# Patient Record
Sex: MALE | Race: WHITE | NOT HISPANIC OR LATINO | URBAN - METROPOLITAN AREA
[De-identification: names, ages, dates, MRNs, and addresses within clinical notes are randomized per-mention and may not be internally consistent; named-entity substitution may affect disease eponyms.]

---

## 2018-04-13 ENCOUNTER — APPOINTMENT (EMERGENCY)
Dept: RADIOLOGY | Facility: HOSPITAL | Age: 33
End: 2018-04-13
Attending: STUDENT IN AN ORGANIZED HEALTH CARE EDUCATION/TRAINING PROGRAM
Payer: COMMERCIAL

## 2018-04-13 ENCOUNTER — HOSPITAL ENCOUNTER (EMERGENCY)
Facility: HOSPITAL | Age: 33
Discharge: HOME | End: 2018-04-14
Attending: STUDENT IN AN ORGANIZED HEALTH CARE EDUCATION/TRAINING PROGRAM
Payer: COMMERCIAL

## 2018-04-13 DIAGNOSIS — E87.6 HYPOKALEMIA: ICD-10-CM

## 2018-04-13 DIAGNOSIS — K42.9 UMBILICAL HERNIA WITHOUT OBSTRUCTION AND WITHOUT GANGRENE: ICD-10-CM

## 2018-04-13 DIAGNOSIS — N28.89 RENAL MASS, LEFT: ICD-10-CM

## 2018-04-13 DIAGNOSIS — R10.9 ABDOMINAL PAIN, UNSPECIFIED ABDOMINAL LOCATION: Primary | ICD-10-CM

## 2018-04-13 LAB
ALBUMIN SERPL-MCNC: 4.8 G/DL (ref 3.4–5)
ALP SERPL-CCNC: 84 IU/L (ref 35–126)
ALT SERPL-CCNC: 21 IU/L (ref 16–63)
ANION GAP SERPL CALC-SCNC: 13 MEQ/L (ref 3–15)
AST SERPL-CCNC: 20 IU/L (ref 15–41)
BASOPHILS # BLD: 0.05 K/UL (ref 0.01–0.1)
BASOPHILS NFR BLD: 0.5 %
BILIRUB SERPL-MCNC: 1.1 MG/DL (ref 0.3–1.2)
BUN SERPL-MCNC: 15 MG/DL (ref 8–20)
CALCIUM SERPL-MCNC: 9.4 MG/DL (ref 8.9–10.3)
CHLORIDE SERPL-SCNC: 100 MMOL/L (ref 98–109)
CO2 SERPL-SCNC: 23 MMOL/L (ref 22–32)
CREAT SERPL-MCNC: 0.9 MG/DL (ref 0.8–1.3)
DIFFERENTIAL METHOD BLD: NORMAL
EOSINOPHIL # BLD: 0.11 K/UL (ref 0.04–0.54)
EOSINOPHIL NFR BLD: 1.1 %
ERYTHROCYTE [DISTWIDTH] IN BLOOD BY AUTOMATED COUNT: 11.9 % (ref 11.6–14.4)
GFR SERPL CREATININE-BSD FRML MDRD: >60 ML/MIN/1.73M*2
GLUCOSE SERPL-MCNC: 91 MG/DL (ref 70–99)
HCT VFR BLDCO AUTO: 45.2 % (ref 40–51)
HGB BLD-MCNC: 16.2 G/DL (ref 13.7–17.5)
IMM GRANULOCYTES # BLD AUTO: 0.02 K/UL (ref 0–0.08)
IMM GRANULOCYTES NFR BLD AUTO: 0.2 %
LIPASE SERPL-CCNC: 18 U/L (ref 20–51)
LYMPHOCYTES # BLD: 2.22 K/UL (ref 1.2–3.5)
LYMPHOCYTES NFR BLD: 22.2 %
MCH RBC QN AUTO: 31 PG (ref 28–33.2)
MCHC RBC AUTO-ENTMCNC: 35.8 G/DL (ref 32.2–36.5)
MCV RBC AUTO: 86.4 FL (ref 83–98)
MONOCYTES # BLD: 0.74 K/UL (ref 0.3–1)
MONOCYTES NFR BLD: 7.4 %
NEUTROPHILS # BLD: 6.87 K/UL (ref 1.7–7)
NEUTS SEG NFR BLD: 68.6 %
NRBC BLD-RTO: 0 %
PDW BLD AUTO: 9.8 FL (ref 9.4–12.4)
PLATELET # BLD AUTO: 274 K/UL (ref 150–350)
POTASSIUM SERPL-SCNC: 3.2 MMOL/L (ref 3.6–5.1)
PROT SERPL-MCNC: 7.6 G/DL (ref 6–8.2)
RBC # BLD AUTO: 5.23 M/UL (ref 4.5–5.8)
SODIUM SERPL-SCNC: 136 MMOL/L (ref 136–144)
WBC # BLD AUTO: 10.01 K/UL (ref 3.8–10.5)

## 2018-04-13 PROCEDURE — 74177 CT ABD & PELVIS W/CONTRAST: CPT

## 2018-04-13 PROCEDURE — 80053 COMPREHEN METABOLIC PANEL: CPT | Performed by: PHYSICIAN ASSISTANT

## 2018-04-13 PROCEDURE — 85025 COMPLETE CBC W/AUTO DIFF WBC: CPT | Performed by: PHYSICIAN ASSISTANT

## 2018-04-13 PROCEDURE — 83690 ASSAY OF LIPASE: CPT | Performed by: STUDENT IN AN ORGANIZED HEALTH CARE EDUCATION/TRAINING PROGRAM

## 2018-04-13 PROCEDURE — 25800000 HC PHARMACY IV SOLUTIONS: Performed by: PHYSICIAN ASSISTANT

## 2018-04-13 PROCEDURE — 36415 COLL VENOUS BLD VENIPUNCTURE: CPT | Performed by: PHYSICIAN ASSISTANT

## 2018-04-13 RX ADMIN — SODIUM CHLORIDE 1000 ML: 9 INJECTION, SOLUTION INTRAVENOUS at 22:49

## 2018-04-13 ASSESSMENT — ENCOUNTER SYMPTOMS
DIZZINESS: 0
CONSTIPATION: 1
NUMBNESS: 0
COUGH: 0
VOMITING: 0
DIFFICULTY URINATING: 0
CHILLS: 0
ABDOMINAL DISTENTION: 0
BACK PAIN: 0
DIARRHEA: 1
EYE PAIN: 0
PALPITATIONS: 0
NECK PAIN: 0
BLOOD IN STOOL: 0
FEVER: 0
WEAKNESS: 0
HEADACHES: 0
NAUSEA: 1
DYSURIA: 0
ABDOMINAL PAIN: 1
COLOR CHANGE: 0

## 2018-04-14 VITALS
SYSTOLIC BLOOD PRESSURE: 132 MMHG | HEIGHT: 71 IN | DIASTOLIC BLOOD PRESSURE: 70 MMHG | BODY MASS INDEX: 34.3 KG/M2 | WEIGHT: 245 LBS | TEMPERATURE: 96.6 F | OXYGEN SATURATION: 100 % | RESPIRATION RATE: 20 BRPM | HEART RATE: 102 BPM

## 2018-04-14 LAB
BILIRUB UR QL STRIP.AUTO: NEGATIVE MG/DL
CLARITY UR REFRACT.AUTO: CLEAR
COLOR UR AUTO: YELLOW
GLUCOSE UR STRIP.AUTO-MCNC: NEGATIVE MG/DL
HGB UR QL STRIP.AUTO: NEGATIVE
KETONES UR STRIP.AUTO-MCNC: 2 MG/DL
LEUKOCYTE ESTERASE UR QL STRIP.AUTO: NEGATIVE
NITRITE UR QL STRIP.AUTO: NEGATIVE
PH UR STRIP.AUTO: 6 [PH]
PROT UR QL STRIP.AUTO: NEGATIVE
SP GR UR REFRACT.AUTO: >1.035
UROBILINOGEN UR STRIP-ACNC: 0.2 EU/DL

## 2018-04-14 PROCEDURE — 63700000 HC SELF-ADMINISTRABLE DRUG: Performed by: STUDENT IN AN ORGANIZED HEALTH CARE EDUCATION/TRAINING PROGRAM

## 2018-04-14 PROCEDURE — 99284 EMERGENCY DEPT VISIT MOD MDM: CPT | Mod: 25

## 2018-04-14 PROCEDURE — 81003 URINALYSIS AUTO W/O SCOPE: CPT | Performed by: PHYSICIAN ASSISTANT

## 2018-04-14 PROCEDURE — 63600105 HC IODINE BASED CONTRAST: Performed by: PHYSICIAN ASSISTANT

## 2018-04-14 RX ORDER — POTASSIUM CHLORIDE 750 MG/1
40 TABLET, FILM COATED, EXTENDED RELEASE ORAL ONCE
Status: COMPLETED | OUTPATIENT
Start: 2018-04-14 | End: 2018-04-14

## 2018-04-14 RX ADMIN — POTASSIUM CHLORIDE 40 MEQ: 750 TABLET, FILM COATED, EXTENDED RELEASE ORAL at 01:53

## 2018-04-14 RX ADMIN — IOHEXOL 100 ML: 300 INJECTION, SOLUTION INTRAVENOUS at 00:35

## 2018-04-14 NOTE — ED PROVIDER NOTES
HPI     Chief Complaint   Patient presents with   • Abdominal Pain   • Nausea       33 y.o M presents to the ED for evaluation of abd pain. Pt states that he had recent travel to the [place] for 1 day and immediately flew back home this morning due to the pain.  Pt states that he has been having constant periumbilical pain x1 week and radiating intermittent LLQ pain. He has 2 umbilical hernias that have not been repaired. 3 days ago the pt had diarrhea, but has not had a BM since. He is unable to tolerate PO solids, but has tolerated PO fluids. Pain is now present in pts LLQ.        Denies dysuria, fever, cough, chills, weakness, numbness, HA, difficulty urinating, CP, vomiting, back/neck pain or any other concerns.     No hx of abdominal surgery    Pt smokes .5 ppd.         History provided by:  Patient   used: No    Abdominal Pain   Pain location:  Periumbilical  Pain quality: aching    Pain radiates to:  LLQ  Pain severity:  Moderate  Onset quality:  Gradual  Duration:  1 week  Timing:  Intermittent  Progression:  Unchanged  Chronicity:  Recurrent  Context: recent travel    Context: not diet changes, not previous surgeries, not recent illness, not sick contacts, not suspicious food intake and not trauma    Relieved by:  Nothing  Worsened by:  Nothing  Ineffective treatments:  None tried  Associated symptoms: constipation, diarrhea and nausea    Associated symptoms: no chest pain, no chills, no cough, no dysuria, no fever and no vomiting    Diarrhea:     Quality:  Unable to specify    Number of occurrences:  3    Severity:  Moderate    Duration:  2 days    Timing:  Intermittent    Progression:  Unable to specify  Nausea:     Severity:  Moderate    Onset quality:  Gradual    Timing:  Intermittent    Progression:  Unable to specify  Risk factors: obesity         Patient History     Past Medical History:   Diagnosis Date   • High cholesterol        History reviewed. No pertinent  "surgical history.    History reviewed. No pertinent family history.    Social History   Substance Use Topics   • Smoking status: Current Every Day Smoker     Packs/day: 0.50   • Smokeless tobacco: Never Used   • Alcohol use Yes      Comment: socially       Systems Reviewed from Nursing Triage:  Problems          Review of Systems     Review of Systems   Constitutional: Negative for chills and fever.   HENT: Negative.  Negative for ear pain.    Eyes: Negative for pain.   Respiratory: Negative for cough.    Cardiovascular: Negative for chest pain and palpitations.   Gastrointestinal: Positive for abdominal pain, constipation, diarrhea and nausea. Negative for abdominal distention, blood in stool and vomiting.   Genitourinary: Negative for difficulty urinating and dysuria.   Musculoskeletal: Negative for back pain and neck pain.   Skin: Negative for color change.   Neurological: Negative for dizziness, weakness, numbness and headaches.        Physical Exam     ED Triage Vitals [04/13/18 2219]   Temp Heart Rate Resp BP SpO2   36.4 °C (97.5 °F) 88 18 131/81 96 %      Temp Source Heart Rate Source Patient Position BP Location FiO2 (%) (Set)   Tympanic -- Sitting Left upper arm --       Pulse Ox %: 96 % (04/13/18 2250)  Pulse Ox Interpretation: Normal (04/13/18 2250)  Heart Rate: 88 (04/13/18 2250)       Patient Vitals for the past 24 hrs:   BP Temp Temp src Pulse Resp SpO2 Height Weight   04/14/18 0043 132/70 (!) 35.9 °C (96.6 °F) Tympanic (!) 102 20 100 % - -   04/13/18 2219 131/81 36.4 °C (97.5 °F) Tympanic 88 18 96 % 1.803 m (5' 11\") 111 kg (245 lb)           Physical Exam   Constitutional: He is oriented to person, place, and time. He appears well-developed and well-nourished. No distress.   HENT:   Head: Normocephalic and atraumatic.   Mouth/Throat: Oropharynx is clear and moist.   Eyes: EOM are normal. Pupils are equal, round, and reactive to light.   Neck: Neck supple. No JVD present.   Cardiovascular: Normal " rate, regular rhythm and normal heart sounds.    No murmur heard.  Pulmonary/Chest: Effort normal and breath sounds normal. No respiratory distress.   Abdominal: Soft. Bowel sounds are normal. He exhibits no distension. There is tenderness in the left lower quadrant. A hernia (2 easily reducible umbilical ) is present.   Musculoskeletal: Normal range of motion. He exhibits no edema or tenderness.   Neurological: He is alert and oriented to person, place, and time. No cranial nerve deficit.   Skin: Skin is warm and dry. No rash noted.   Psychiatric: He has a normal mood and affect.   Nursing note and vitals reviewed.           Procedures    ED Course & MDM     Labs Reviewed   COMPREHENSIVE METABOLIC PANEL - Abnormal        Result Value    Potassium 3.2 (*)     Sodium 136      Chloride 100      CO2 23      BUN 15      Creatinine 0.9      Glucose 91      Calcium 9.4      AST (SGOT) 20      ALT (SGPT) 21      Alkaline Phosphatase 84      Total Protein 7.6      Albumin 4.8      Bilirubin, Total 1.1      eGFR >60.0      Anion Gap 13     UA REFLEX CULTURE (MACROSCOPIC) - Abnormal     Specific Gravity, Urine >1.035 (*)     Ketones, Urine +2 (*)     Color, Urine Yellow      Clarity, Urine Clear      pH, Urine 6.0      Leukocyte Esterase Negative      Nitrite, Urine Negative      Protein, Urine Negative      Glucose, Urine Negative      Urobilinogen, Urine 0.2      Bilirubin, Urine Negative      Blood, Urine Negative     LIPASE - Abnormal     Lipase 18 (*)    CBC - Normal    WBC 10.01      RBC 5.23      Hemoglobin 16.2      Hematocrit 45.2      MCV 86.4      MCH 31.0      MCHC 35.8      RDW 11.9      Platelets 274      MPV 9.8     CBC AND DIFFERENTIAL    Narrative:     The following orders were created for panel order CBC and differential.  Procedure                               Abnormality         Status                     ---------                               -----------         ------                     CBC[[id number]]                            Normal              Final result               Diff Count[[id number]]                                        Final result                 Please view results for these tests on the individual orders.   DIFF COUNT    Differential Type Auto      nRBC 0.0      Immature Granulocytes 0.2      Neutrophils 68.6      Lymphocytes 22.2      Monocytes 7.4      Eosinophils 1.1      Basophils 0.5      Immature Granulocytes, Absolute 0.02      Neutrophils, Absolute 6.87      Lymphocytes, Absolute 2.22      Monocytes, Absolute 0.74      Eosinophils, Absolute 0.11      Basophils, Absolute 0.05     URINALYSIS REFLEX CULTURE    Narrative:     The following orders were created for panel order Urinalysis w/ reflex culture.  Procedure                               Abnormality         Status                     ---------                               -----------         ------                     UA Reflex to Culture (Mac...[[id number]]  Abnormal            Final result                 Please view results for these tests on the individual orders.       CT ABDOMEN PELVIS WITH IV CONTRAST   Final Result   IMPRESSION:   1.  Nonspecific fat stranding within a moderately sized fat containing umbilical   hernia without evidence of drainable fluid collection or abscess. While this   finding is nonspecific, in the right clinical setting this may represent   cellulitis.  I would also correlate with physical examination to ensure that   this hernia is reducible and is not clinically incarcerated.   2.  Colonic diverticulosis without evidence of diverticulitis.   3.  Normal appendix.   4.  Left upper pole 1.2 cm renal hypodensity, which cannot be characterized as a   simple renal cyst. A nonemergent renal ultrasound can be considered for further   evaluation.      I certify that I have personally reviewed this study and agree with this report.   Marbella Rivera MD              MDM  By signing my name below, I, Chris Rivero,  attest that this documentation has been prepared under the direction and in the presence of Sanjiv Denis PA-C.    4/13/2018 10:49 PM      I, PETEY Negron, personally performed the services described in this documentation. All medical record entries made by the scribe were at my direction and in my presence. I have reviewed the chart and discharge instructions (if applicable) and agree that the record reflects my personal performance and is accurate and complete.   4/14/2018 9:17 PM                ED Course as of Apr 14 2117   Sat Apr 14, 2018   0108 Rad resident:   Impression     IMPRESSION:  1.  Nonspecific fat stranding within a moderately sized fat containing umbilical  hernia without evidence of drainable fluid collection or abscess. While this  finding is nonspecific, in the right clinical setting this may represent  cellulitis.  2.  Colonic diverticulosis without evidence of diverticulitis.  3.  Normal appendix.  4.  Left upper pole 1.2 cm renal hypodensity, which cannot be characterized as a  simple renal cyst. A nonemergent renal ultrasound can be considered for further  evaluation.    I certify that I have personally reviewed this study and agree with this report.  Test Doc Test Doc  Lab and Collection     CT ABDOMEN PELVIS WITH IV CONTRAST on 4/13/2018     [NS]   0109 Pt updated on CT and lab findings. Encouraged to give urine sample at this time.   [NS]   0209 UA unremarkable for infection. Likely slight dehydration. Pt stable for dc home.   [NS]      ED Course User Index  [NS] David Whitaker DO         Clinical Impressions as of Apr 14 2117   Abdominal pain, unspecified abdominal location   Hypokalemia   Umbilical hernia without obstruction and without gangrene   Renal mass, left     Disposition:  Discharge     Chris Rivero  04/13/18 2249       PETEY Argueta  04/14/18 2118

## 2018-04-14 NOTE — ED ATTESTATION NOTE
"I saw and evaluated the patient, participated in the management, and agree with the findings in the above note. We discussed the case and the treatment plan.  Hx: pt states left sided abd pain. Intermittent. No exacerbating or alleviating factors. Dose note recent constipation and chronic umbilical hernia.   Exam: mild tachycardia but otherwise vss. Nad, nontoxic appearing. Head at/nc with normal speech. No resp distress. Abd soft without ttp upon my exam. No mass palpated. Umbilica hernia noted and reduced without sign of incarceration. Skin exam normal without warmth, tenderness or erythema (no sign of abdominal wall cellulitis).   Plan: all results reviewed. Abd pain possibly related to constipation. Encouraged miralax and hydration this weekend. Informed of left renal \"hypodensity\" and understands to f/u with pcp for US and continued monitoring. Hernia is not obstructed and stranding noted on CT scan was noted but exam not c/w cellulitis. Questions/concerns addressed. Pt is stable for dc home. Return if worse.      David Whitaker DO  04/14/18 0219    "

## 2018-04-14 NOTE — DISCHARGE INSTRUCTIONS
"Follow up with your primary care physician in 1-3 days for repeat evaluation to ensure improvement and resolution of symptoms.     During your evaluation today a CAT scan was performed. A left upper renal \"hypodensity\" was noted on CAT scan. A non-emergent renal (kidney) ultrasound is recommended for further evaluation. Follow up with your primary care physician for this.     Return to the ER if worse (for example fever, concerning pain, chest pain, shortness of breath) or for any other concern.     "

## 2018-04-26 ENCOUNTER — OFFICE VISIT (OUTPATIENT)
Dept: SURGERY | Facility: CLINIC | Age: 33
End: 2018-04-26
Payer: COMMERCIAL

## 2018-04-26 VITALS — HEIGHT: 71 IN | WEIGHT: 240 LBS | BODY MASS INDEX: 33.6 KG/M2

## 2018-04-26 DIAGNOSIS — K43.9 VENTRAL HERNIA WITHOUT OBSTRUCTION OR GANGRENE: Primary | ICD-10-CM

## 2018-04-26 PROCEDURE — 99204 OFFICE O/P NEW MOD 45 MIN: CPT | Performed by: SURGERY

## 2018-04-26 RX ORDER — SODIUM CHLORIDE 9 MG/ML
INJECTION, SOLUTION INTRAVENOUS CONTINUOUS
Status: CANCELLED | OUTPATIENT
Start: 2018-04-26 | End: 2018-04-27

## 2018-04-26 RX ORDER — ACETAMINOPHEN 325 MG/1
650 TABLET ORAL ONCE
Status: CANCELLED | OUTPATIENT
Start: 2018-04-26 | End: 2018-04-26

## 2018-04-26 RX ORDER — CELECOXIB 100 MG/1
200 CAPSULE ORAL ONCE
Status: CANCELLED | OUTPATIENT
Start: 2018-04-26 | End: 2018-04-26

## 2018-04-26 NOTE — LETTER
2018     Myke Briceño, DO  [address] [facility] [place] [address]    Patient: Alcides Reilly   YOB: 1985   Date of Visit: 2018       Dear Dr. Briceño:    I had the pleasure of seeing Alcides Reilly today for evaluation. Below are my notes for this consultation.    If you have questions, please do not hesitate to call me. I look forward to following your patient along with you.         Sincerely,        Manish Sanabria MD        CC: No Recipients  Manish Sanabria MD  2018  1:53 PM  Sign at close encounter  Patient ID: Alcides Reilly                              : 1985  MRN: [id number]                                            Visit Date: 2018  Encounter Provider: Manish Sanabria    Chief Complaint: Hernia (New patient )    Alcides Reilly is a 33 y.o. old male presenting for evaluation of an umbilical hernia.  He complains of a bulge in his umbilicus his entire life.  Recently it is started getting more uncomfortable.  A few weeks ago it became slightly red and more protuberant.  It is painful at times.  He went to the emergency room at [facility] on .  A CT scan confirmed a hernia.  It was reducible and he was discharged home.  He found me online.    Medications: No current outpatient prescriptions on file.    Past Medical History:  has a past medical history of High cholesterol.  He had a right knee injury at work and is awaiting surgery.  He is hoping to have knee surgery in late May.    Past Surgical History: He had an umbilical hernia that was infected in infancy and failed to heal.  Social History:  reports that he has been smoking.  He has been smoking about 0.50 packs per day. He has never used smokeless tobacco. He reports that he drinks alcohol. He reports that he does not use drugs.  Is a [employer] .  He is currently not working because of his knee injury.  He lives with his wife and children.  Family History: family history  "includes Hypertension in his father; Throat cancer in his father.   Allergies: has No Known Allergies.     Review of Systems:  Fourteen systems were reviewed, and the patient answered positive for abdominal pain, constipation, and diarrhea. All other systems are negative.     Physical Exam  Vitals: Ht 1.803 m (5' 11\")   Wt 109 kg (240 lb)   BMI 33.47 kg/m²    General appearance: Very pleasant, no acute distress. Alert and oriented times three.  HEENT: Normocephalic, without obvious abnormality, atraumatic. Conjunctiva clear. Sclerae anicteric. Nares normal. Nasal mucosa normal. No drainage. Neck supple with no adenopathy.  Lungs: Clear to auscultation and percussion bilaterally.   Heart: Regular rate and rhythm.  Normal S1 and S2 without murmer.  Abdomen: Soft nontender nondistended.  There is a wide hernia at the top of the umbilicus.  It is nontender and reducible.  It seems to contain multilobulated fat.  Extremities: Warm and well perfused. No edema.  Skin: No rash, lesion, ecchymosis, or jaundice.  Neurologic:  Grossly normal. Normal mood and affect.    Studies:  Reviewed his CT scan images from April 13.  There is an umbilical hernia 3.8 cm wide by 3 cm tall.  It contains fat.    Assessment and Plan:  Large umbilical hernia.  We long discussion about the risks and benefits of surgery.  I recommended a robotic repair with mesh under general anesthesia.  He will check his calendar and schedule it when he is ready.    Manish Sanabria MD               "

## 2018-04-26 NOTE — PROGRESS NOTES
"Patient ID: Alcides Reilly                              : 1985  MRN: [id number]                                            Visit Date: 2018  Encounter Provider: Manish Sanabria    Chief Complaint: Hernia (New patient )    Alcides Reilly is a 33 y.o. old male presenting for evaluation of an umbilical hernia.  He complains of a bulge in his umbilicus his entire life.  Recently it is started getting more uncomfortable.  A few weeks ago it became slightly red and more protuberant.  It is painful at times.  He went to the emergency room at [facility] on .  A CT scan confirmed a hernia.  It was reducible and he was discharged home.  He found me online.    Medications: No current outpatient prescriptions on file.    Past Medical History:  has a past medical history of High cholesterol.  He had a right knee injury at work and is awaiting surgery.  He is hoping to have knee surgery in late May.    Past Surgical History: He had an umbilical hernia that was infected in infancy and failed to heal.  Social History:  reports that he has been smoking.  He has been smoking about 0.50 packs per day. He has never used smokeless tobacco. He reports that he drinks alcohol. He reports that he does not use drugs.  Is a [employer] .  He is currently not working because of his knee injury.  He lives with his wife and children.  Family History: family history includes Hypertension in his father; Throat cancer in his father.   Allergies: has No Known Allergies.     Review of Systems:  Fourteen systems were reviewed, and the patient answered positive for abdominal pain, constipation, and diarrhea. All other systems are negative.     Physical Exam  Vitals: Ht 1.803 m (5' 11\")   Wt 109 kg (240 lb)   BMI 33.47 kg/m²   General appearance: Very pleasant, no acute distress. Alert and oriented times three.  HEENT: Normocephalic, without obvious abnormality, atraumatic. Conjunctiva clear. Sclerae anicteric. " Nares normal. Nasal mucosa normal. No drainage. Neck supple with no adenopathy.  Lungs: Clear to auscultation and percussion bilaterally.   Heart: Regular rate and rhythm.  Normal S1 and S2 without murmer.  Abdomen: Soft nontender nondistended.  There is a wide hernia at the top of the umbilicus.  It is nontender and reducible.  It seems to contain multilobulated fat.  Extremities: Warm and well perfused. No edema.  Skin: No rash, lesion, ecchymosis, or jaundice.  Neurologic:  Grossly normal. Normal mood and affect.    Studies:  Reviewed his CT scan images from April 13.  There is an umbilical hernia 3.8 cm wide by 3 cm tall.  It contains fat.    Assessment and Plan:  Large umbilical hernia.  We long discussion about the risks and benefits of surgery.  I recommended a robotic repair with mesh under general anesthesia.  He will check his calendar and schedule it when he is ready.    Manish Sanabria MD

## 2018-04-27 PROBLEM — K43.9 VENTRAL HERNIA WITHOUT OBSTRUCTION OR GANGRENE: Status: ACTIVE | Noted: 2018-04-27

## 2018-05-04 ENCOUNTER — HOSPITAL ENCOUNTER (OUTPATIENT)
Facility: HOSPITAL | Age: 33
Setting detail: HOSPITAL OUTPATIENT SURGERY
Discharge: HOME | End: 2018-05-04
Attending: SURGERY | Admitting: SURGERY
Payer: COMMERCIAL

## 2018-05-04 ENCOUNTER — ANESTHESIA EVENT (OUTPATIENT)
Dept: OPERATING ROOM | Facility: HOSPITAL | Age: 33
Setting detail: HOSPITAL OUTPATIENT SURGERY
End: 2018-05-04
Payer: COMMERCIAL

## 2018-05-04 VITALS
DIASTOLIC BLOOD PRESSURE: 68 MMHG | OXYGEN SATURATION: 98 % | TEMPERATURE: 98.1 F | SYSTOLIC BLOOD PRESSURE: 131 MMHG | RESPIRATION RATE: 18 BRPM | HEART RATE: 78 BPM

## 2018-05-04 DIAGNOSIS — K43.9 VENTRAL HERNIA WITHOUT OBSTRUCTION OR GANGRENE: Primary | ICD-10-CM

## 2018-05-04 LAB
ABO + RH BLD: NORMAL
ABO + RH BLD: NORMAL
BLD GP AB SCN SERPL QL: NEGATIVE
D AG BLD QL: NEGATIVE
D AG BLD QL: NEGATIVE
LABORATORY COMMENT REPORT: NORMAL
LABORATORY COMMENT REPORT: NORMAL
POTASSIUM SERPL-SCNC: 3.7 MMOL/L (ref 3.6–5.1)

## 2018-05-04 PROCEDURE — 63600000 HC DRUGS/DETAIL CODE: Mod: JW | Performed by: NURSE ANESTHETIST, CERTIFIED REGISTERED

## 2018-05-04 PROCEDURE — C1781 MESH (IMPLANTABLE): HCPCS | Performed by: SURGERY

## 2018-05-04 PROCEDURE — 84132 ASSAY OF SERUM POTASSIUM: CPT | Performed by: SURGERY

## 2018-05-04 PROCEDURE — 25800000 HC PHARMACY IV SOLUTIONS: Performed by: NURSE ANESTHETIST, CERTIFIED REGISTERED

## 2018-05-04 PROCEDURE — 8E0W4CZ ROBOTIC ASSISTED PROCEDURE OF TRUNK REGION, PERCUTANEOUS ENDOSCOPIC APPROACH: ICD-10-PCS | Performed by: SURGERY

## 2018-05-04 PROCEDURE — 63700000 HC SELF-ADMINISTRABLE DRUG: Performed by: SURGERY

## 2018-05-04 PROCEDURE — 71000001 HC PACU PHASE 1 INITIAL 30MIN: Performed by: SURGERY

## 2018-05-04 PROCEDURE — 27200000 HC STERILE SUPPLY: Performed by: SURGERY

## 2018-05-04 PROCEDURE — 36000004 HC OR LEVEL 4 INITIAL 30MIN: Performed by: SURGERY

## 2018-05-04 PROCEDURE — 36415 COLL VENOUS BLD VENIPUNCTURE: CPT | Performed by: SURGERY

## 2018-05-04 PROCEDURE — 37000001 HC ANESTHESIA GENERAL: Performed by: SURGERY

## 2018-05-04 PROCEDURE — 0WUF4JZ SUPPLEMENT ABDOMINAL WALL WITH SYNTHETIC SUBSTITUTE, PERCUTANEOUS ENDOSCOPIC APPROACH: ICD-10-PCS | Performed by: SURGERY

## 2018-05-04 PROCEDURE — 36000014 HC OR LEVEL 4 EA ADDL MIN: Performed by: SURGERY

## 2018-05-04 PROCEDURE — 25800000 HC PHARMACY IV SOLUTIONS: Performed by: SURGERY

## 2018-05-04 PROCEDURE — 86900 BLOOD TYPING SEROLOGIC ABO: CPT

## 2018-05-04 PROCEDURE — 71000011 HC PACU PHASE 1 EA ADDL MIN: Performed by: SURGERY

## 2018-05-04 PROCEDURE — 63600000 HC DRUGS/DETAIL CODE: Performed by: SURGERY

## 2018-05-04 PROCEDURE — 71000002 HC PACU PHASE 2 INITIAL 30MIN: Performed by: SURGERY

## 2018-05-04 PROCEDURE — 49652 PR LAP, VENTRAL HERNIA REPAIR,REDUCIBLE: CPT | Performed by: SURGERY

## 2018-05-04 PROCEDURE — S2900 ROBOTIC SURGICAL SYSTEM: HCPCS | Performed by: SURGERY

## 2018-05-04 PROCEDURE — 71000012 HC PACU PHASE 2 EA ADDL MIN: Performed by: SURGERY

## 2018-05-04 PROCEDURE — 25000000 HC PHARMACY GENERAL: Performed by: NURSE ANESTHETIST, CERTIFIED REGISTERED

## 2018-05-04 PROCEDURE — 25000000 HC PHARMACY GENERAL: Performed by: SURGERY

## 2018-05-04 PROCEDURE — 63600000 HC DRUGS/DETAIL CODE: Performed by: ANESTHESIOLOGY

## 2018-05-04 DEVICE — PARIETEX PROGRIP RECT 15X15CM: Type: IMPLANTABLE DEVICE | Site: ABDOMEN | Status: FUNCTIONAL

## 2018-05-04 RX ORDER — PROPOFOL 10 MG/ML
INJECTION, EMULSION INTRAVENOUS AS NEEDED
Status: DISCONTINUED | OUTPATIENT
Start: 2018-05-04 | End: 2018-05-04 | Stop reason: SURG

## 2018-05-04 RX ORDER — OXYCODONE AND ACETAMINOPHEN 5; 325 MG/1; MG/1
1 TABLET ORAL AS NEEDED
Status: DISCONTINUED | OUTPATIENT
Start: 2018-05-04 | End: 2018-05-04

## 2018-05-04 RX ORDER — HYDROMORPHONE HYDROCHLORIDE 2 MG/ML
0.5 INJECTION, SOLUTION INTRAMUSCULAR; INTRAVENOUS; SUBCUTANEOUS
Status: DISCONTINUED | OUTPATIENT
Start: 2018-05-04 | End: 2018-05-04 | Stop reason: HOSPADM

## 2018-05-04 RX ORDER — SODIUM CHLORIDE 9 MG/ML
INJECTION, SOLUTION INTRAVENOUS CONTINUOUS
Status: DISCONTINUED | OUTPATIENT
Start: 2018-05-04 | End: 2018-05-04 | Stop reason: HOSPADM

## 2018-05-04 RX ORDER — MIDAZOLAM HYDROCHLORIDE 2 MG/2ML
INJECTION, SOLUTION INTRAMUSCULAR; INTRAVENOUS AS NEEDED
Status: DISCONTINUED | OUTPATIENT
Start: 2018-05-04 | End: 2018-05-04 | Stop reason: SURG

## 2018-05-04 RX ORDER — OXYCODONE AND ACETAMINOPHEN 5; 325 MG/1; MG/1
1 TABLET ORAL EVERY 6 HOURS PRN
Status: DISCONTINUED | OUTPATIENT
Start: 2018-05-04 | End: 2018-05-04 | Stop reason: HOSPADM

## 2018-05-04 RX ORDER — ROCURONIUM BROMIDE 10 MG/ML
INJECTION, SOLUTION INTRAVENOUS AS NEEDED
Status: DISCONTINUED | OUTPATIENT
Start: 2018-05-04 | End: 2018-05-04 | Stop reason: SURG

## 2018-05-04 RX ORDER — NEOSTIGMINE METHYLSULFATE 1 MG/ML
INJECTION INTRAVENOUS AS NEEDED
Status: DISCONTINUED | OUTPATIENT
Start: 2018-05-04 | End: 2018-05-04 | Stop reason: SURG

## 2018-05-04 RX ORDER — CELECOXIB 200 MG/1
200 CAPSULE ORAL ONCE
Status: COMPLETED | OUTPATIENT
Start: 2018-05-04 | End: 2018-05-04

## 2018-05-04 RX ORDER — DEXAMETHASONE SODIUM PHOSPHATE 4 MG/ML
INJECTION, SOLUTION INTRA-ARTICULAR; INTRALESIONAL; INTRAMUSCULAR; INTRAVENOUS; SOFT TISSUE AS NEEDED
Status: DISCONTINUED | OUTPATIENT
Start: 2018-05-04 | End: 2018-05-04 | Stop reason: SURG

## 2018-05-04 RX ORDER — FENTANYL CITRATE 50 UG/ML
50 INJECTION, SOLUTION INTRAMUSCULAR; INTRAVENOUS
Status: DISCONTINUED | OUTPATIENT
Start: 2018-05-04 | End: 2018-05-04 | Stop reason: HOSPADM

## 2018-05-04 RX ORDER — GLYCOPYRROLATE 0.6MG/3ML
SYRINGE (ML) INTRAVENOUS AS NEEDED
Status: DISCONTINUED | OUTPATIENT
Start: 2018-05-04 | End: 2018-05-04 | Stop reason: SURG

## 2018-05-04 RX ORDER — LIDOCAINE HYDROCHLORIDE 10 MG/ML
INJECTION, SOLUTION INFILTRATION; PERINEURAL AS NEEDED
Status: DISCONTINUED | OUTPATIENT
Start: 2018-05-04 | End: 2018-05-04 | Stop reason: SURG

## 2018-05-04 RX ORDER — SODIUM CHLORIDE, SODIUM GLUCONATE, SODIUM ACETATE, POTASSIUM CHLORIDE AND MAGNESIUM CHLORIDE 30; 37; 368; 526; 502 MG/100ML; MG/100ML; MG/100ML; MG/100ML; MG/100ML
INJECTION, SOLUTION INTRAVENOUS CONTINUOUS PRN
Status: DISCONTINUED | OUTPATIENT
Start: 2018-05-04 | End: 2018-05-04 | Stop reason: SURG

## 2018-05-04 RX ORDER — ACETAMINOPHEN 325 MG/1
650 TABLET ORAL ONCE
Status: COMPLETED | OUTPATIENT
Start: 2018-05-04 | End: 2018-05-04

## 2018-05-04 RX ORDER — CEFAZOLIN SODIUM/WATER 1 G/10 ML
2 SYRINGE (ML) INTRAVENOUS
Status: COMPLETED | OUTPATIENT
Start: 2018-05-04 | End: 2018-05-04

## 2018-05-04 RX ORDER — OXYCODONE AND ACETAMINOPHEN 5; 325 MG/1; MG/1
1 TABLET ORAL EVERY 4 HOURS PRN
Qty: 20 TABLET | Refills: 0
Start: 2018-05-04 | End: 2018-05-09

## 2018-05-04 RX ORDER — ONDANSETRON HYDROCHLORIDE 2 MG/ML
INJECTION, SOLUTION INTRAVENOUS AS NEEDED
Status: DISCONTINUED | OUTPATIENT
Start: 2018-05-04 | End: 2018-05-04 | Stop reason: SURG

## 2018-05-04 RX ORDER — FENTANYL CITRATE 50 UG/ML
INJECTION, SOLUTION INTRAMUSCULAR; INTRAVENOUS AS NEEDED
Status: DISCONTINUED | OUTPATIENT
Start: 2018-05-04 | End: 2018-05-04 | Stop reason: SURG

## 2018-05-04 RX ORDER — ONDANSETRON HYDROCHLORIDE 2 MG/ML
4 INJECTION, SOLUTION INTRAVENOUS
Status: DISCONTINUED | OUTPATIENT
Start: 2018-05-04 | End: 2018-05-04 | Stop reason: HOSPADM

## 2018-05-04 RX ORDER — BUPIVACAINE HYDROCHLORIDE 5 MG/ML
INJECTION, SOLUTION EPIDURAL; INTRACAUDAL AS NEEDED
Status: DISCONTINUED | OUTPATIENT
Start: 2018-05-04 | End: 2018-05-04 | Stop reason: HOSPADM

## 2018-05-04 RX ADMIN — LIDOCAINE HYDROCHLORIDE 5 ML: 10 INJECTION, SOLUTION INFILTRATION; PERINEURAL at 07:32

## 2018-05-04 RX ADMIN — CELECOXIB 200 MG: 200 CAPSULE ORAL at 06:44

## 2018-05-04 RX ADMIN — ROCURONIUM BROMIDE 50 MG: 10 INJECTION, SOLUTION INTRAVENOUS at 07:32

## 2018-05-04 RX ADMIN — SODIUM CHLORIDE, SODIUM GLUCONATE, SODIUM ACETATE, POTASSIUM CHLORIDE AND MAGNESIUM CHLORIDE: 526; 502; 368; 37; 30 INJECTION, SOLUTION INTRAVENOUS at 09:08

## 2018-05-04 RX ADMIN — FENTANYL CITRATE 100 MCG: 50 INJECTION, SOLUTION INTRAMUSCULAR; INTRAVENOUS at 07:32

## 2018-05-04 RX ADMIN — PROPOFOL 350 MG: 10 INJECTION, EMULSION INTRAVENOUS at 07:32

## 2018-05-04 RX ADMIN — GLYCOPYRROLATE 0.6 MG: 0.2 INJECTION INTRAMUSCULAR; INTRAVENOUS at 10:21

## 2018-05-04 RX ADMIN — SODIUM CHLORIDE: 9 INJECTION, SOLUTION INTRAVENOUS at 07:26

## 2018-05-04 RX ADMIN — Medication 4 MG: at 10:21

## 2018-05-04 RX ADMIN — ONDANSETRON 4 MG: 2 INJECTION INTRAMUSCULAR; INTRAVENOUS at 10:15

## 2018-05-04 RX ADMIN — OXYCODONE HYDROCHLORIDE AND ACETAMINOPHEN 1 TABLET: 5; 325 TABLET ORAL at 15:59

## 2018-05-04 RX ADMIN — FENTANYL CITRATE 50 MCG: 50 INJECTION, SOLUTION INTRAMUSCULAR; INTRAVENOUS at 09:43

## 2018-05-04 RX ADMIN — PROPOFOL 50 MG: 10 INJECTION, EMULSION INTRAVENOUS at 07:34

## 2018-05-04 RX ADMIN — ACETAMINOPHEN 650 MG: 325 TABLET, FILM COATED ORAL at 06:44

## 2018-05-04 RX ADMIN — MIDAZOLAM HYDROCHLORIDE 2 MG: 1 INJECTION, SOLUTION INTRAMUSCULAR; INTRAVENOUS at 07:26

## 2018-05-04 RX ADMIN — FENTANYL CITRATE 50 MCG: 50 INJECTION, SOLUTION INTRAMUSCULAR; INTRAVENOUS at 10:53

## 2018-05-04 RX ADMIN — DEXAMETHASONE SODIUM PHOSPHATE 4 MG: 4 INJECTION, SOLUTION INTRAMUSCULAR; INTRAVENOUS at 07:42

## 2018-05-04 RX ADMIN — ROCURONIUM BROMIDE 20 MG: 10 INJECTION, SOLUTION INTRAVENOUS at 08:10

## 2018-05-04 RX ADMIN — OXYCODONE HYDROCHLORIDE AND ACETAMINOPHEN 1 TABLET: 5; 325 TABLET ORAL at 13:29

## 2018-05-04 RX ADMIN — Medication 2 G: at 07:28

## 2018-05-04 ASSESSMENT — PAIN - FUNCTIONAL ASSESSMENT
PAIN_FUNCTIONAL_ASSESSMENT: NO/DENIES PAIN
PAIN_FUNCTIONAL_ASSESSMENT: 0-10

## 2018-05-04 NOTE — OP NOTE
Robotic ventral hernia repair Procedure Note    Procedure:    Robotic ventral hernia repair  CPT(R) Code:  12037 - CO LAP, VENTRAL HERNIA REPAIR,REDUCIBLE      Pre-op Diagnosis     * Ventral hernia without obstruction or gangrene [K43.9]       Post-op Diagnosis     * Ventral hernia without obstruction or gangrene [K43.9]    Surgeon(s) and Role:     * Manish Sanabria MD - Primary    Anesthesia: General    Staff:   Circulator: Sherice Ontiveros RN; Marcella Cavanaugh RN  Scrub Person: Ashley Savage    Findings: Large umbilical hernia as well as a small epigastric hernia about 3 cm superior to the larger defect.    Procedure Details   A small incision was made at Lees's point in the left upper quadrant.  A Veress needle was inserted and the abdomen was insufflated to 15 mmHg.  Three 8 mm trochars were placed on the far left side of the abdomen.  The da Yanely X I robot was docked.  The peritoneum was incised in the left side of the abdomen.  A peritoneal flap was then mobilized across the midline, reducing the hernia contents in the process.  The preperitoneal space was developed around both hernia defects to create space for a mesh underlay.  There was a large umbilical hernia defect over 3 cm in diameter.  It was closed with running 2-0 PDS Stratafix suture.  There was a 1 cm epigastric hernia that was separately closed with a 2-0 PDS Stratafix suture.  A 12 cm [place] of ProGrip mesh was placed in the preperitoneal space as a sublay, overlapping both hernia closures with wide overlap in all directions.  Finally, the peritoneum was closed with running 2-0 Monocryl Stratafix suture.  A small hole in the hernia sac had been created during dissection and reduction of the hernia.  This was closed with a separate figure-of-eight suture using a remnant of the 2-0 PDS Stratafix suture.  After final inspection, the ports were removed under laparoscopic vision.  After correct needle and instrument counts, skin was closed with 4-0  Monocryl subcuticular suture.    Estimated Blood Loss: No blood loss documented.    Specimens:                  Order Name Source Comment Collection Info Order Time   POTASSIUM Blood, Venous  Collected By: Iraida Crespo RN 5/4/2018  6:42 AM   TYPE AND SCREEN Blood, Venous  Collected By: Alisson Perales RN 5/4/2018  6:43 AM   REPEAT ABO/RH (TYPE CHECK) Blood, Venous  Collected By: Iraida Crespo RN 5/4/2018  6:43 AM         Drains:   [REMOVED] Urethral Catheter Latex 16 Fr (Removed)   Removed 05/04/18 1027   Urine Characteristics clear 5/4/2018  8:03 AM   Securement Method Tape 5/4/2018  8:03 AM       Implants:   Implant Name Type Inv. Item Serial No.  Lot No. LRB No. Used   PARIETEX PROGRIP RECT [id number] - [id number] Mesh Surgical PARIETEX PROGRIP RECT [id number]   [employer] [id number] N/A 1              Complications:  None; patient tolerated the procedure well.           Disposition: PACU - hemodynamically stable.           Condition: stable    Manish Sanabria MD  Phone Number: [phone]

## 2018-05-04 NOTE — ANESTHESIA PREPROCEDURE EVALUATION
Anesthesia ROS/MED HX    Anesthesia History    Previous anesthetics  Pulmonary - neg  Neuro/Psych - neg  Cardiovascular- neg  GI/Hepatic- neg  Musculoskeletal- neg  Endo/Other- neg   Body Habitus: Obese      History reviewed. No pertinent surgical history.    Physical Exam    Airway   Mallampati: III   TM distance: >3 FB   Neck ROM: full  Cardiovascular - normal   Rhythm: regular   Rate: normal  Pulmonary - normal   clear to auscultation  Dental - normal        Anesthesia Plan    Plan: general    Technique: general endotracheal     Airway: oral intubation   ASA 2  Anesthetic plan and risks discussed with: patient and spouse      Lab Results   Component Value Date    WBC 10.01 04/13/2018    HGB 16.2 04/13/2018    HCT 45.2 04/13/2018    MCV 86.4 04/13/2018     04/13/2018       Lab Results   Component Value Date    GLUCOSE 91 04/13/2018    CALCIUM 9.4 04/13/2018     04/13/2018    K 3.2 (L) 04/13/2018    CO2 23 04/13/2018     04/13/2018    BUN 15 04/13/2018    CREATININE 0.9 04/13/2018       No results found for: HCGPREGUR, PREGSERUM, HCG, HCGQUANT          Current Facility-Administered Medications   Medication Dose Route Frequency Provider Last Rate Last Dose   • ceFAZolin in sterile water (ANCEF) injection 2 g  2 g intravenous 60 min Pre-Op Manish Sanabria MD       • sodium chloride 0.9 % infusion   intravenous Continuous Manish Sanabria MD           Prior to Admission medications    Not on File       Patient Active Problem List   Diagnosis   • Ventral hernia without obstruction or gangrene       History reviewed. No pertinent surgical history.   Tonsils as child

## 2018-05-04 NOTE — DISCHARGE INSTRUCTIONS
Dr. Sanabria’s Post-Operative Instructions  Robotic ventral hernia repair    Remove any Band-Aids the day following your surgery. Leave any Steri-strips (small strips of tape) or surgical glue in place on the incisions, and they will peel off by themselves.    You may shower starting the day after surgery.      Resume your normal diet as tolerated.    Bruising and mild swelling at the incisions are normal. There may be a small, firm lump under each incision. This is all normal and will disappear within a few weeks.    You are likely to have some constipation for the first few days after surgery, due to the anesthesia and pain medication. Straining to move your bowels can be painful at the surgical site.  To make it easier, you may drink 4-6 oz of prune juice each morning. If there is still no bowel movement after the first 4 days, take Milk of Magnesia 2 tablespoons in the morning. If there is still no bowel movement by the following morning after breakfast, please call the office.    You may resume light activity that is comfortable, including walking, climbing stairs, and going outdoors.  Don’t do anything more strenuous until you return for your follow-up visit. Sexual activity may be resumed cautiously whenever comfortable. You may ride in a car but do not drive until you are completely comfortable doing so and no longer taking narcotic pain medication.    Take Tylenol every 6 hours for pain control. This will provide low-level pain relief. Take the prescribed narcotic for breakthrough pain that is not controlled with Tylenol alone.      You may resume your usual medications, including aspirin, the day after surgery. If you are on a blood thinner, discuss with Dr. Sanabria the timing to re-start it.    Call the office for a postoperative appointment 10-14 days after your surgery.     Call the office to report any of the following symptoms:  Fever greater than 101.  Nausea and vomiting.  Increasing redness or  tenderness, or drainage from your incisions.    Please call the office at [phone] with any questions or concerns.  You may speak with the nurse (Vee Acosta RN) or leave a message for Dr. Sanabria.

## 2018-05-04 NOTE — ANESTHESIA PROCEDURE NOTES
Airway  Urgency: elective    Start Time: 5/4/2018 7:37 AM    General Information and Staff    Patient location during procedure: OR    Indications and Patient Condition  Indications for airway management: anesthesia  Sedation level: deep  Preoxygenated: yes  Patient position: sniffing      Final Airway Details  Final airway type: endotracheal airway      Successful airway: ETT    Successful intubation technique: video laryngoscopy  Facilitating devices/methods: intubating stylet and cricoid pressure  Endotracheal tube insertion site: oral  Blade size: #3  ETT size: 7.0 mm  Cormack-Lehane Classification: grade IIa - partial view of glottis  Placement verified by: chest auscultation and capnometry   Measured from: lips  Number of attempts at approach: 1  Number of other approaches attempted: 2      Other Attempts  Unsuccessful attempted endotracheal techniques: direct laryngoscopy

## 2018-05-04 NOTE — ANESTHESIA POSTPROCEDURE EVALUATION
Patient: Alcides Reilly    Procedure Summary     Date:  05/04/18 Room / Location:  [facility] OR [facility] / [facility] OR    Anesthesia Start:  0725 Anesthesia Stop:  1036    Procedure:  Robotic ventral hernia repair (N/A ) Diagnosis:       Ventral hernia without obstruction or gangrene      (Ventral hernia without obstruction or gangrene [K43.9])    Surgeon:  Manish Sanabria MD Responsible Provider:  Emerald Hollins MD    Anesthesia Type:  general ASA Status:  2          Anesthesia Type: general  PACU Vitals  5/4/2018 1029 - 5/4/2018 1129      5/4/2018 1035 5/4/2018 1040 5/4/2018 1045 5/4/2018 1102    BP: (!)  107/58 - (!)  142/57 (!)  145/69    Pulse: (!)  104 90 90 86    Resp: 13 15 16 12    SpO2: 96 % 99 % 98 % -            Anesthesia Post Evaluation    Pain management: satisfactory to patient  Mode of pain management: IV medication  Patient location during evaluation: PACU  Patient participation: complete - patient participated  Level of consciousness: awake and alert  Cardiovascular status: acceptable  Airway Patency: adequate  Respiratory status: acceptable  Hydration status: stable  Anesthetic complications: no

## 2018-05-04 NOTE — H&P (VIEW-ONLY)
"Patient ID: Alcides Reilly                              : 1985  MRN: [id number]                                            Visit Date: 2018  Encounter Provider: Manish Sanabria    Chief Complaint: Hernia (New patient )    Alcides Reilly is a 33 y.o. old male presenting for evaluation of an umbilical hernia.  He complains of a bulge in his umbilicus his entire life.  Recently it is started getting more uncomfortable.  A few weeks ago it became slightly red and more protuberant.  It is painful at times.  He went to the emergency room at [facility] on .  A CT scan confirmed a hernia.  It was reducible and he was discharged home.  He found me online.    Medications: No current outpatient prescriptions on file.    Past Medical History:  has a past medical history of High cholesterol.  He had a right knee injury at work and is awaiting surgery.  He is hoping to have knee surgery in late May.    Past Surgical History: He had an umbilical hernia that was infected in infancy and failed to heal.  Social History:  reports that he has been smoking.  He has been smoking about 0.50 packs per day. He has never used smokeless tobacco. He reports that he drinks alcohol. He reports that he does not use drugs.  Is a [employer] .  He is currently not working because of his knee injury.  He lives with his wife and children.  Family History: family history includes Hypertension in his father; Throat cancer in his father.   Allergies: has No Known Allergies.     Review of Systems:  Fourteen systems were reviewed, and the patient answered positive for abdominal pain, constipation, and diarrhea. All other systems are negative.     Physical Exam  Vitals: Ht 1.803 m (5' 11\")   Wt 109 kg (240 lb)   BMI 33.47 kg/m²   General appearance: Very pleasant, no acute distress. Alert and oriented times three.  HEENT: Normocephalic, without obvious abnormality, atraumatic. Conjunctiva clear. Sclerae anicteric. " Nares normal. Nasal mucosa normal. No drainage. Neck supple with no adenopathy.  Lungs: Clear to auscultation and percussion bilaterally.   Heart: Regular rate and rhythm.  Normal S1 and S2 without murmer.  Abdomen: Soft nontender nondistended.  There is a wide hernia at the top of the umbilicus.  It is nontender and reducible.  It seems to contain multilobulated fat.  Extremities: Warm and well perfused. No edema.  Skin: No rash, lesion, ecchymosis, or jaundice.  Neurologic:  Grossly normal. Normal mood and affect.    Studies:  Reviewed his CT scan images from April 13.  There is an umbilical hernia 3.8 cm wide by 3 cm tall.  It contains fat.    Assessment and Plan:  Large umbilical hernia.  We long discussion about the risks and benefits of surgery.  I recommended a robotic repair with mesh under general anesthesia.  He will check his calendar and schedule it when he is ready.    Manish Sanabria MD

## 2018-05-04 NOTE — OR SURGEON
Pre-Procedure patient identification:  I am the primary operating surgeon/proceduralist and I have identified the patient on 05/04/18 at 7:18 AM Manish Sanabria MD  Phone Number: [phone]

## 2018-05-29 ENCOUNTER — OFFICE VISIT (OUTPATIENT)
Dept: SURGERY | Facility: CLINIC | Age: 33
End: 2018-05-29
Payer: COMMERCIAL

## 2018-05-29 VITALS — WEIGHT: 240 LBS | BODY MASS INDEX: 33.6 KG/M2 | HEIGHT: 71 IN

## 2018-05-29 DIAGNOSIS — K43.9 VENTRAL HERNIA WITHOUT OBSTRUCTION OR GANGRENE: Primary | ICD-10-CM

## 2018-05-29 PROCEDURE — 99024 POSTOP FOLLOW-UP VISIT: CPT | Performed by: SURGERY

## 2018-05-29 NOTE — PROGRESS NOTES
Status post robotic ventral hernia repair.  He had a large umbilical hernia with a smaller epigastric hernia.  Both were sutured closed, and a 12 cm preperitoneal mesh underlay was placed.    On follow-up today, he is in great spirits.  There is minimal pain at this point.  He is eating well and having normal bowel movements.  On exam, his abdomen is soft and nontender.  The incisions are healing very well.  The hernia repair is intact.  There is no significant swelling.    Follow-up in 6 weeks if needed.

## 2018-05-29 NOTE — LETTER
May 29, 2018     yMke Briceño DO  [address] [facility] [address]    Patient: Alcides Reilly   YOB: 1985   Date of Visit: 5/29/2018       Dear Dr. Briceño:    Thank you for referring Alcides Reilly who returned for a postop visit today.  Below are my progress notes.    If you have questions, please do not hesitate to call me.  Thank you again for the opportunity to care for this patient.       Sincerely,        Manish Sanabria MD        CC: No Recipients  Manish Sanabria MD  5/29/2018 11:49 AM  Sign at close encounter  Status post robotic ventral hernia repair.  He had a large umbilical hernia with a smaller epigastric hernia.  Both were sutured closed, and a 12 cm preperitoneal mesh underlay was placed.    On follow-up today, he is in great spirits.  There is minimal pain at this point.  He is eating well and having normal bowel movements.  On exam, his abdomen is soft and nontender.  The incisions are healing very well.  The hernia repair is intact.  There is no significant swelling.    Follow-up in 6 weeks if needed.

## 2018-06-07 ENCOUNTER — APPOINTMENT (OUTPATIENT)
Dept: PREADMISSION TESTING | Facility: HOSPITAL | Age: 33
End: 2018-06-07
Attending: ORTHOPAEDIC SURGERY
Payer: COMMERCIAL

## 2018-06-07 VITALS
DIASTOLIC BLOOD PRESSURE: 68 MMHG | HEART RATE: 86 BPM | WEIGHT: 249 LBS | HEIGHT: 71 IN | BODY MASS INDEX: 34.86 KG/M2 | SYSTOLIC BLOOD PRESSURE: 127 MMHG | RESPIRATION RATE: 16 BRPM | TEMPERATURE: 97.1 F

## 2018-06-07 ASSESSMENT — ENCOUNTER SYMPTOMS
ARTHRALGIAS: 1
RESPIRATORY NEGATIVE: 1
CARDIOVASCULAR NEGATIVE: 1

## 2018-06-07 NOTE — H&P
"   History and Physical  Pre-admission testing         HISTORY OF PRESENT ILLNESS      Alcides Reilly is an 33 y.o. male who presents with a right knee medial meniscus tear. He is scheduled fpr a [facility] knee arthroscopy.    PAST MEDICAL AND SURGICAL HISTORY      Past Medical History:   Diagnosis Date   • High cholesterol        Past Surgical History:   Procedure Laterality Date   • HERNIA REPAIR  05/04/2018    umbilical   • TONSILLECTOMY         PROBLEM LIST     Patient Active Problem List   Diagnosis   (none) - all problems resolved or deleted       MEDICATIONS      No current outpatient prescriptions on file.    ALLERGIES      No Known Allergies    FAMILY HISTORY      Family History   Problem Relation Age of Onset   • Throat cancer Father    • Hypertension Father        SOCIAL HISTORY      Social History     Social History   • Marital status:      Spouse name: N/A   • Number of children: N/A   • Years of education: N/A     Occupational History   • Not on file.     Social History Main Topics   • Smoking status: Current Every Day Smoker     Packs/day: 0.50     Years: 18.00   • Smokeless tobacco: Never Used   • Alcohol use Yes      Comment: socially   • Drug use: No   • Sexual activity: Defer     Other Topics Concern   • Not on file     Social History Narrative   • No narrative on file       REVIEW OF SYSTEMS      Review of Systems   Respiratory: Negative.         Denies MONTALVO   Cardiovascular: Negative.         Denies chest pain   Musculoskeletal: Positive for arthralgias.        Right knee       PHYSICAL EXAMINATION      /68 (BP Location: Right upper arm, Patient Position: Sitting)   Pulse 86   Temp 36.2 °C (97.1 °F)   Resp 16 Comment: 95%  Ht 1.803 m (5' 11\")   Wt 113 kg (249 lb)   BMI 34.73 kg/m²   Body mass index is 34.73 kg/m².    Physical Exam   Constitutional: He is oriented to person, place, and time. He appears well-developed and well-nourished.   HENT:   Head: Normocephalic and atraumatic. "   Mouth/Throat: Oropharynx is clear and moist.   Eyes: Conjunctivae and EOM are normal. Pupils are equal, round, and reactive to light.   Neck: Normal range of motion. Neck supple.   Cardiovascular: Normal rate, regular rhythm, normal heart sounds and intact distal pulses.    Pulmonary/Chest: Effort normal and breath sounds normal.   Abdominal: Soft. Bowel sounds are normal.   Musculoskeletal: He exhibits tenderness.   Right knee brace   Neurological: He is alert and oriented to person, place, and time.   Skin: Skin is warm and dry.   Psychiatric: He has a normal mood and affect. His behavior is normal. Judgment and thought content normal.   Nursing note and vitals reviewed.         KYLE Perez  6/7/2018

## 2018-06-07 NOTE — PRE-PROCEDURE INSTRUCTIONS
1. We will call you between 3 pm and 7 pm on Cara 15, 2018 to determine that arrival time for your procedure. If you do not hear by 4:30 PM. Please call [phone] for arrival time.    2. Please report to [place] in [address] / [place], walk into main lobby and report to the admission desk on first floor on the day of your procedure.   3. Please follow the following fasting guidelines:   Nothing to eat or drink after midnight unless otherwise instructed by  your physician. No gum mints candy. Brush teeth/Rinse Mouth   4.    5. Other Instructions: No aspirin advil aleve. Tylenol OK   6. If you develop a cold, cough, fever, rash, or other symptom prior to the data of the procedure, please report it to your physician immediately.   7. If you need to cancel the procedure for any reason, please contact your physician or call the unit listed above.   8. Make arrangements to have someone drive you home from the procedure. If you have not arranged for transportation home, your surgery may be cancelled.    9. You may not take public transportation unless accompanied by a responsible person.   10. You may not drive a car or operate complex or potentially dangerous machinery for 24 hours following anesthesia and/or sedation.   11. If it is medically necessary for you to have a longer stay, you will be informed as soon as the decision is made.   12. Do not wear or bring anything of value to the hospital including jewelry of any kind. Do not wear make-up or contact lenses. DO bring your glasses and hearing aid.   13. No lotion, creams, powders, or oils on skin the morning of procedure    14. Dress in comfortable clothes.   15.  If instructed, please bring a copy of your Advanced Directive (Living Will/Durable Power of ) on the day of your procedure.      Pre operative instructions given as per protocol.  Form explained by: KYLE Perez     I have read and understand the above information. I have had sufficient  opportunity to ask questions I might have and they have been answered to my satisfaction. I agree to comply with the Patient Responsibilities listed above and have received a copy of this form.

## 2018-06-18 ENCOUNTER — ANESTHESIA EVENT (OUTPATIENT)
Dept: SURGERY | Facility: HOSPITAL | Age: 33
Setting detail: HOSPITAL OUTPATIENT SURGERY
End: 2018-06-18
Payer: COMMERCIAL

## 2018-06-18 ENCOUNTER — HOSPITAL ENCOUNTER (OUTPATIENT)
Facility: HOSPITAL | Age: 33
Setting detail: HOSPITAL OUTPATIENT SURGERY
Discharge: HOME | End: 2018-06-18
Attending: ORTHOPAEDIC SURGERY | Admitting: ORTHOPAEDIC SURGERY
Payer: COMMERCIAL

## 2018-06-18 VITALS
SYSTOLIC BLOOD PRESSURE: 123 MMHG | HEIGHT: 71 IN | RESPIRATION RATE: 18 BRPM | TEMPERATURE: 97.3 F | HEART RATE: 89 BPM | WEIGHT: 246 LBS | OXYGEN SATURATION: 97 % | DIASTOLIC BLOOD PRESSURE: 74 MMHG | BODY MASS INDEX: 34.44 KG/M2

## 2018-06-18 PROCEDURE — 27200000 HC STERILE SUPPLY: Performed by: ORTHOPAEDIC SURGERY

## 2018-06-18 PROCEDURE — 25000000 HC PHARMACY GENERAL: Performed by: NURSE ANESTHETIST, CERTIFIED REGISTERED

## 2018-06-18 PROCEDURE — 25800000 HC PHARMACY IV SOLUTIONS: Performed by: STUDENT IN AN ORGANIZED HEALTH CARE EDUCATION/TRAINING PROGRAM

## 2018-06-18 PROCEDURE — 71000011 HC PACU PHASE 1 EA ADDL MIN: Performed by: ORTHOPAEDIC SURGERY

## 2018-06-18 PROCEDURE — G8979 MOBILITY GOAL STATUS: HCPCS | Mod: GP,CH

## 2018-06-18 PROCEDURE — 63600000 HC DRUGS/DETAIL CODE: Performed by: NURSE ANESTHETIST, CERTIFIED REGISTERED

## 2018-06-18 PROCEDURE — 71000001 HC PACU PHASE 1 INITIAL 30MIN: Performed by: ORTHOPAEDIC SURGERY

## 2018-06-18 PROCEDURE — 71000002 HC PACU PHASE 2 INITIAL 30MIN: Performed by: ORTHOPAEDIC SURGERY

## 2018-06-18 PROCEDURE — 63600000 HC DRUGS/DETAIL CODE: Performed by: STUDENT IN AN ORGANIZED HEALTH CARE EDUCATION/TRAINING PROGRAM

## 2018-06-18 PROCEDURE — 0SBC4ZZ EXCISION OF RIGHT KNEE JOINT, PERCUTANEOUS ENDOSCOPIC APPROACH: ICD-10-PCS | Performed by: ORTHOPAEDIC SURGERY

## 2018-06-18 PROCEDURE — 63600000 HC DRUGS/DETAIL CODE: Performed by: ORTHOPAEDIC SURGERY

## 2018-06-18 PROCEDURE — 97161 PT EVAL LOW COMPLEX 20 MIN: CPT | Mod: GP

## 2018-06-18 PROCEDURE — 63600000 HC DRUGS/DETAIL CODE: Performed by: ANESTHESIOLOGY

## 2018-06-18 PROCEDURE — 37000001 HC ANESTHESIA GENERAL: Performed by: ORTHOPAEDIC SURGERY

## 2018-06-18 PROCEDURE — 36000004 HC OR LEVEL 4 INITIAL 30MIN: Performed by: ORTHOPAEDIC SURGERY

## 2018-06-18 PROCEDURE — 36000014 HC OR LEVEL 4 EA ADDL MIN: Performed by: ORTHOPAEDIC SURGERY

## 2018-06-18 PROCEDURE — G8980 MOBILITY D/C STATUS: HCPCS | Mod: GP,CH

## 2018-06-18 PROCEDURE — 71000012 HC PACU PHASE 2 EA ADDL MIN: Performed by: ORTHOPAEDIC SURGERY

## 2018-06-18 PROCEDURE — 63700000 HC SELF-ADMINISTRABLE DRUG: Performed by: STUDENT IN AN ORGANIZED HEALTH CARE EDUCATION/TRAINING PROGRAM

## 2018-06-18 PROCEDURE — 25000000 HC PHARMACY GENERAL: Performed by: ORTHOPAEDIC SURGERY

## 2018-06-18 PROCEDURE — G8978 MOBILITY CURRENT STATUS: HCPCS | Mod: GP,CH

## 2018-06-18 RX ORDER — HYDROMORPHONE HYDROCHLORIDE 2 MG/ML
0.5 INJECTION, SOLUTION INTRAMUSCULAR; INTRAVENOUS; SUBCUTANEOUS
Status: DISCONTINUED | OUTPATIENT
Start: 2018-06-18 | End: 2018-06-18 | Stop reason: HOSPADM

## 2018-06-18 RX ORDER — ONDANSETRON 4 MG/1
4 TABLET, ORALLY DISINTEGRATING ORAL ONCE
Status: DISCONTINUED | OUTPATIENT
Start: 2018-06-18 | End: 2018-06-18 | Stop reason: HOSPADM

## 2018-06-18 RX ORDER — DEXTROSE 40 %
15-30 GEL (GRAM) ORAL AS NEEDED
Status: DISCONTINUED | OUTPATIENT
Start: 2018-06-18 | End: 2018-06-18 | Stop reason: HOSPADM

## 2018-06-18 RX ORDER — MORPHINE SULFATE 2 MG/ML
1 INJECTION, SOLUTION INTRAMUSCULAR; INTRAVENOUS
Status: DISCONTINUED | OUTPATIENT
Start: 2018-06-18 | End: 2018-06-18 | Stop reason: HOSPADM

## 2018-06-18 RX ORDER — ONDANSETRON 4 MG/1
4 TABLET, ORALLY DISINTEGRATING ORAL EVERY 8 HOURS PRN
Status: DISCONTINUED | OUTPATIENT
Start: 2018-06-18 | End: 2018-06-18 | Stop reason: HOSPADM

## 2018-06-18 RX ORDER — SODIUM CHLORIDE 9 MG/ML
INJECTION, SOLUTION INTRAVENOUS CONTINUOUS
Status: DISCONTINUED | OUTPATIENT
Start: 2018-06-18 | End: 2018-06-18 | Stop reason: HOSPADM

## 2018-06-18 RX ORDER — IBUPROFEN 200 MG
16-32 TABLET ORAL AS NEEDED
Status: DISCONTINUED | OUTPATIENT
Start: 2018-06-18 | End: 2018-06-18 | Stop reason: HOSPADM

## 2018-06-18 RX ORDER — DEXTROSE 50 % IN WATER (D50W) INTRAVENOUS SYRINGE
25 AS NEEDED
Status: DISCONTINUED | OUTPATIENT
Start: 2018-06-18 | End: 2018-06-18 | Stop reason: HOSPADM

## 2018-06-18 RX ORDER — LIDOCAINE HYDROCHLORIDE AND EPINEPHRINE 10; 10 UG/ML; MG/ML
INJECTION, SOLUTION INFILTRATION; PERINEURAL AS NEEDED
Status: DISCONTINUED | OUTPATIENT
Start: 2018-06-18 | End: 2018-06-18 | Stop reason: HOSPADM

## 2018-06-18 RX ORDER — ACETAMINOPHEN 325 MG/1
650 TABLET ORAL EVERY 4 HOURS PRN
Status: DISCONTINUED | OUTPATIENT
Start: 2018-06-18 | End: 2018-06-18 | Stop reason: HOSPADM

## 2018-06-18 RX ORDER — OXYCODONE HYDROCHLORIDE 5 MG/1
5-10 TABLET ORAL
Status: DISCONTINUED | OUTPATIENT
Start: 2018-06-18 | End: 2018-06-18 | Stop reason: HOSPADM

## 2018-06-18 RX ORDER — FENTANYL CITRATE 50 UG/ML
INJECTION, SOLUTION INTRAMUSCULAR; INTRAVENOUS AS NEEDED
Status: DISCONTINUED | OUTPATIENT
Start: 2018-06-18 | End: 2018-06-18 | Stop reason: SURG

## 2018-06-18 RX ORDER — PROPOFOL 10 MG/ML
INJECTION, EMULSION INTRAVENOUS AS NEEDED
Status: DISCONTINUED | OUTPATIENT
Start: 2018-06-18 | End: 2018-06-18 | Stop reason: SURG

## 2018-06-18 RX ORDER — ONDANSETRON HYDROCHLORIDE 2 MG/ML
4 INJECTION, SOLUTION INTRAVENOUS
Status: DISCONTINUED | OUTPATIENT
Start: 2018-06-18 | End: 2018-06-18 | Stop reason: HOSPADM

## 2018-06-18 RX ORDER — MIDAZOLAM HYDROCHLORIDE 2 MG/2ML
INJECTION, SOLUTION INTRAMUSCULAR; INTRAVENOUS AS NEEDED
Status: DISCONTINUED | OUTPATIENT
Start: 2018-06-18 | End: 2018-06-18 | Stop reason: SURG

## 2018-06-18 RX ORDER — HYDROMORPHONE HYDROCHLORIDE 2 MG/ML
INJECTION, SOLUTION INTRAMUSCULAR; INTRAVENOUS; SUBCUTANEOUS AS NEEDED
Status: DISCONTINUED | OUTPATIENT
Start: 2018-06-18 | End: 2018-06-18 | Stop reason: SURG

## 2018-06-18 RX ORDER — ONDANSETRON HYDROCHLORIDE 2 MG/ML
INJECTION, SOLUTION INTRAVENOUS AS NEEDED
Status: DISCONTINUED | OUTPATIENT
Start: 2018-06-18 | End: 2018-06-18 | Stop reason: SURG

## 2018-06-18 RX ORDER — DEXAMETHASONE SODIUM PHOSPHATE 4 MG/ML
INJECTION, SOLUTION INTRA-ARTICULAR; INTRALESIONAL; INTRAMUSCULAR; INTRAVENOUS; SOFT TISSUE AS NEEDED
Status: DISCONTINUED | OUTPATIENT
Start: 2018-06-18 | End: 2018-06-18 | Stop reason: SURG

## 2018-06-18 RX ORDER — LIDOCAINE HYDROCHLORIDE 10 MG/ML
INJECTION, SOLUTION EPIDURAL; INFILTRATION; INTRACAUDAL; PERINEURAL AS NEEDED
Status: DISCONTINUED | OUTPATIENT
Start: 2018-06-18 | End: 2018-06-18 | Stop reason: SURG

## 2018-06-18 RX ORDER — FENTANYL CITRATE 50 UG/ML
50 INJECTION, SOLUTION INTRAMUSCULAR; INTRAVENOUS
Status: DISCONTINUED | OUTPATIENT
Start: 2018-06-18 | End: 2018-06-18 | Stop reason: HOSPADM

## 2018-06-18 RX ORDER — CEFAZOLIN SODIUM/WATER 1 G/10 ML
2 SYRINGE (ML) INTRAVENOUS
Status: COMPLETED | OUTPATIENT
Start: 2018-06-18 | End: 2018-06-18

## 2018-06-18 RX ORDER — METHYLPREDNISOLONE ACETATE 40 MG/ML
INJECTION, SUSPENSION INTRA-ARTICULAR; INTRALESIONAL; INTRAMUSCULAR; SOFT TISSUE AS NEEDED
Status: DISCONTINUED | OUTPATIENT
Start: 2018-06-18 | End: 2018-06-18 | Stop reason: HOSPADM

## 2018-06-18 RX ADMIN — HYDROMORPHONE HYDROCHLORIDE 1 MG: 2 INJECTION, SOLUTION INTRAMUSCULAR; INTRAVENOUS; SUBCUTANEOUS at 12:51

## 2018-06-18 RX ADMIN — DEXAMETHASONE SODIUM PHOSPHATE 8 MG: 4 INJECTION, SOLUTION INTRA-ARTICULAR; INTRALESIONAL; INTRAMUSCULAR; INTRAVENOUS; SOFT TISSUE at 12:46

## 2018-06-18 RX ADMIN — ONDANSETRON 4 MG: 2 INJECTION INTRAMUSCULAR; INTRAVENOUS at 13:14

## 2018-06-18 RX ADMIN — FENTANYL CITRATE 50 MCG: 50 INJECTION, SOLUTION INTRAMUSCULAR; INTRAVENOUS at 12:36

## 2018-06-18 RX ADMIN — FENTANYL CITRATE 50 MCG: 50 INJECTION INTRAMUSCULAR; INTRAVENOUS at 14:00

## 2018-06-18 RX ADMIN — PROPOFOL 280 MG: 10 INJECTION, EMULSION INTRAVENOUS at 12:36

## 2018-06-18 RX ADMIN — LIDOCAINE HYDROCHLORIDE 5 ML: 10 INJECTION, SOLUTION EPIDURAL; INFILTRATION; INTRACAUDAL; PERINEURAL at 12:36

## 2018-06-18 RX ADMIN — HYDROMORPHONE HYDROCHLORIDE 0.5 MG: 2 INJECTION, SOLUTION INTRAMUSCULAR; INTRAVENOUS; SUBCUTANEOUS at 13:15

## 2018-06-18 RX ADMIN — FENTANYL CITRATE 50 MCG: 50 INJECTION, SOLUTION INTRAMUSCULAR; INTRAVENOUS at 12:50

## 2018-06-18 RX ADMIN — MIDAZOLAM HYDROCHLORIDE 2 MG: 1 INJECTION, SOLUTION INTRAMUSCULAR; INTRAVENOUS at 12:28

## 2018-06-18 RX ADMIN — SODIUM CHLORIDE: 9 INJECTION, SOLUTION INTRAVENOUS at 10:25

## 2018-06-18 RX ADMIN — FENTANYL CITRATE 50 MCG: 50 INJECTION, SOLUTION INTRAMUSCULAR; INTRAVENOUS at 13:05

## 2018-06-18 RX ADMIN — HYDROMORPHONE HYDROCHLORIDE 0.5 MG: 2 INJECTION, SOLUTION INTRAMUSCULAR; INTRAVENOUS; SUBCUTANEOUS at 13:11

## 2018-06-18 RX ADMIN — FENTANYL CITRATE 50 MCG: 50 INJECTION, SOLUTION INTRAMUSCULAR; INTRAVENOUS at 12:46

## 2018-06-18 RX ADMIN — Medication 2 G: at 12:45

## 2018-06-18 RX ADMIN — ONDANSETRON 4 MG: 4 TABLET, ORALLY DISINTEGRATING ORAL at 16:00

## 2018-06-18 ASSESSMENT — LIFESTYLE VARIABLES: TOBACCO_USE: 1

## 2018-06-18 ASSESSMENT — PAIN - FUNCTIONAL ASSESSMENT: PAIN_FUNCTIONAL_ASSESSMENT: 0-10

## 2018-06-18 NOTE — DISCHARGE INSTRUCTIONS
[facility] Post Operative Surgical Instructions    Surgeon: Kassandra Almonte, DO          You had the following procedure performed at [facility] on 6/18/2018.    Procedure: Surgical Arthroscopy Right Knee with Synovectomy    Weight Bearing:  -If you had surgery on your leg, use crutches as needed.    Dressing/Wound Care  -DO NOT remove dressing unless instructed to do so by your surgeon.  -Keep dressings clean and dry.  -Once dressing is removed, DO NOT soak incision, NO baths. After you shower, pat incision dry lightly.  -Some drainage from incisions is to be expected.  However, if significant drainage occurs, call the office or go to emergency room immediately.  -If you experience fevers and chills with increase in pain, redness and/or drainage from incisions, call the office or go to emergency room immediately.  -   DO NOT remove dressing until seen by your surgeon.  -   If provided, please wear your brace/splint until seen by your surgeon.    Office Follow Up  -If you do not already have a follow up appointment, please call the following office as soon as you get home to schedule an appointment.  -Your surgeon would like to see you in the office next week       [place] Office        [phone]     [facility] Office        [phone]     [facility] Office            [phone]     [place] [facility]            [phone]    Medications  Please take your pain medication with food.  You may use Tylenol or Motrin for pain instead of the narcotics if your pain is not significant.  Narcotic pain medication can be constipating.  If you experience constipation, try an over the counter stool softener.  -  Percocet 5/325.  Take 1 to 2 tablets by mouth every 4 to 6 hours as needed for pain.  -  Keflex 500mg.  Take 1 tablet by mouth twice daily for 2 days.  This is an antibiotic.  Please use as directed.  -  Clindamycin 600 mg.  1 tablet by mouth twice daily for 2 days.  This is an  antibiotic.  Please use as directed.        Information  DO NOT drive until seen by your surgeon.  You may resume your previous diet.  Always keep your dressings clean and dry.  DO NOT return to work until seen by your surgeon.  If you have any questions or concerns, please do not hesitate to call the office.        [facility]  [address] [facility]  [address]  [place] [address]  [phone]  Fax: [phone]    [facility] Office  [address]  [address] [place] [address]  [phone]  Fax: [phone]    [facility] Office  [facility], [address] Floor  [address] [place] [address]  [phone]  Fax: [phone]    [place] Office  [facility]  [address] [facility]  [place] [address]  [phone]  Fax: [phone]

## 2018-06-18 NOTE — BRIEF OP NOTE
Right Knee Arthroscopy, partial synovectomy and resection of plica (R) Procedure Note    Procedure:    Right Knee Arthroscopy, partial synovectomy and resection of plica  CPT(R) Code:  05254 - [place] KNEE SCOPE,MED OR LAT MENISCECTOMY      * No Diagnosis Codes entered *       * No Diagnosis Codes entered *    Surgeon(s) and Role:     * Jc Gonzalez,  - Resident - Assisting     * Sae Plascencia,  - Resident - Assisting     * Kassandra Almonte DO - Primary    Anesthesia: Choice    Staff:   Circulator: Helen Goldberg RN; Massiel Berrios RN  Scrub Person: Kelly Osorio RN; Massiel Berrios RN    Procedure Details   See Dictation    Estimated Blood Loss: No blood loss documented.    Specimens:                No specimens collected during this procedure.      Drains:  None    Implants: * No implants in log *           Complications:  None; patient tolerated the procedure well.           Disposition: PACU - hemodynamically stable.           Condition: stable    Kassandra Almonte DO  Phone Number: [phone]

## 2018-06-18 NOTE — OR SURGEON
Pre-Procedure patient identification:  I am the primary operating surgeon/proceduralist and I have identified the patient on 06/18/18 at 12:00 PM Kassandra Almonte DO  Phone Number: [phone]

## 2018-06-18 NOTE — H&P (VIEW-ONLY)
"   History and Physical  Pre-admission testing         HISTORY OF PRESENT ILLNESS      Alcides Reilly is an 33 y.o. male who presents with a right knee medial meniscus tear. He is scheduled fpr a [facility] knee arthroscopy.    PAST MEDICAL AND SURGICAL HISTORY      Past Medical History:   Diagnosis Date   • High cholesterol        Past Surgical History:   Procedure Laterality Date   • HERNIA REPAIR  05/04/2018    umbilical   • TONSILLECTOMY         PROBLEM LIST     Patient Active Problem List   Diagnosis   (none) - all problems resolved or deleted       MEDICATIONS      No current outpatient prescriptions on file.    ALLERGIES      No Known Allergies    FAMILY HISTORY      Family History   Problem Relation Age of Onset   • Throat cancer Father    • Hypertension Father        SOCIAL HISTORY      Social History     Social History   • Marital status:      Spouse name: N/A   • Number of children: N/A   • Years of education: N/A     Occupational History   • Not on file.     Social History Main Topics   • Smoking status: Current Every Day Smoker     Packs/day: 0.50     Years: 18.00   • Smokeless tobacco: Never Used   • Alcohol use Yes      Comment: socially   • Drug use: No   • Sexual activity: Defer     Other Topics Concern   • Not on file     Social History Narrative   • No narrative on file       REVIEW OF SYSTEMS      Review of Systems   Respiratory: Negative.         Denies MONTALVO   Cardiovascular: Negative.         Denies chest pain   Musculoskeletal: Positive for arthralgias.        Right knee       PHYSICAL EXAMINATION      /68 (BP Location: Right upper arm, Patient Position: Sitting)   Pulse 86   Temp 36.2 °C (97.1 °F)   Resp 16 Comment: 95%  Ht 1.803 m (5' 11\")   Wt 113 kg (249 lb)   BMI 34.73 kg/m²   Body mass index is 34.73 kg/m².    Physical Exam   Constitutional: He is oriented to person, place, and time. He appears well-developed and well-nourished.   HENT:   Head: Normocephalic and atraumatic. "   Mouth/Throat: Oropharynx is clear and moist.   Eyes: Conjunctivae and EOM are normal. Pupils are equal, round, and reactive to light.   Neck: Normal range of motion. Neck supple.   Cardiovascular: Normal rate, regular rhythm, normal heart sounds and intact distal pulses.    Pulmonary/Chest: Effort normal and breath sounds normal.   Abdominal: Soft. Bowel sounds are normal.   Musculoskeletal: He exhibits tenderness.   Right knee brace   Neurological: He is alert and oriented to person, place, and time.   Skin: Skin is warm and dry.   Psychiatric: He has a normal mood and affect. His behavior is normal. Judgment and thought content normal.   Nursing note and vitals reviewed.         KYLE Perez  6/7/2018

## 2018-06-18 NOTE — OP NOTE
REPORT TYPE:  Operative Note    DATE OF OPERATION:  06/18/2018      PREOPERATIVE DIAGNOSIS:  Right knee, rule out internal derangement.    POSTOPERATIVE DIAGNOSIS:  Right knee hypertrophic synovium/plica/synovitis, medial compartment.    SURGEON:  Kassandra Almonte DO    FIRST ASSISTANT:  Sae Plascencia DO    SECOND ASSISTANT:  Jc Gonzalez DO    PROCEDURES:  1.  Examination of right knee under anesthesia.  2.  Arthroscopic surgery for partial synovectomy, resection of plica and injection admixture of 8 mL of 1% lidocaine with epinephrine and 80 mg Depo-Medrol.    GROSS FINDINGS:  The right knee medially in the area of pain revealed a thickened hypertrophy of synovial fold.  It was in the anteromedial aspect of the knee.  The medial meniscus and lateral meniscus were meticulously palpated and noted to be intact.    The condylar surfaces were noted to be intact.  The cruciates were noted to be intact.  There was no instability, no loose bodies.    DESCRIPTION OF PROCEDURE:  The patient was taken to the operating room, administered general endotracheal anesthesia.  Tourniquet was placed about the right proximal thigh.  Before the procedure started, timeout was done.  The right lower extremity was   then prepped and draped in the usual sterile fashion.  Right leg was elevated, exsanguinated with an Esmarch.  Tourniquet was inflated to 350 mmHg.  Anterolateral and anteromedial portals were fashioned and used interchangeably for the arthroscope,   handheld instrumentation, motorized instrumentation and probe.  Diagnostic phase ensued by first viewing the suprapatellar pouch, followed by patellofemoral joint, medial joint, medial gutter, lateral joint, lateral gutter.  Gross findings were noted as   above.  Attention was redirected to the medial compartment.  With the use of motorized instrumentation, resection of synovial fold/synovitis/plica-type structure was resected back.  The joint was then copiously irrigated with large  amounts of normal   saline solution.  All arthroscopic instrumentation and irrigation was removed from the knee.  Arthroscopic wounds were repaired using 3-0 Ethilon suture in interrupted fashion.  The knee was then injected with admixture of 8 mL 1% lidocaine and 80 mg of   Depo-Medrol.  The wounds were then dressed in sterile fashion using Adaptic, 4x3 gauze, sterile Webril and an Ace wrap.  Tourniquet was deflated.  Neurovascular status in the right lower extremity noted to be intact.  The patient was transferred to   recovery room in satisfactory condition.      PAOLA CHAPPELL DO        CC:     DD: 06/18/2018 13:22  DT: 06/18/2018 14:26  Voice ID: [id number]/Report ID: [id number]  [id number]

## 2018-06-18 NOTE — PROGRESS NOTES
Patient: Alcides Reilly  Location: [facility] OR  MRN: [id number]  Today's date: 6/18/2018     pt seen pre-op for crutch training, pt issued Ac and reviewefd HEP  Pt left on stretcher with wife in room    Prior Living Environment  Lives With: child(efrain), dependent, spouse  Living Arrangements: house  Living Environment Comment: 2Sh Equipment Currently Used at Home: none       Prior Level of Function  Ambulation: independent  Transferring: independent  Toileting: independent  Bathing: independent  Dressing: independent  Eating: independent  Communication: understands/communicates without difficulty  Swallowing: swallows foods/liquids without difficulty  Equipment Currently Used at Home: none  Prior Functional Level Comment: works full time as            PT Evaluation - 06/18/18 1032        Session Details    Document Type initial evaluation    Mode of Treatment physical therapy    Patient/Family Observations wife present       Time Calculation    Start Time 1032    Stop Time 1044    Time Calculation (min) 12 min       General Information    Patient Profile Reviewed? yes    Onset of Illness/Injury or Date of Surgery 06/18/18    Pertinent History of Current Functional Problem pre-op [facility]    Existing Precautions/Restrictions no known precautions/restrictions   anticipated WBAT       Orientation Log    Name of [facility] 3-->spontaneous/free recall    Month 3-->spontaneous/free recall    Date 3-->spontaneous/free recall    Year 3-->spontaneous/free recall       Sensory    Sensory General Assessment no sensation deficits identified       Range of Motion (ROM)    General Range of Motion no range of motion deficits identified       Manual Muscle Testing (MMT)    General MMT Assessment no strength deficits identified       Bed Chair WC Transfer Training    Bed Mobility Assessment/Interventions supine to sit;sit to supine    Sit-Stand Transfers, [place] independent    Stand-Sit  Transfers, [place] independent    Supine to Sit, [place] independent    Sit to Supine, [place] independent       Gait Training    [place] modified independence    Assistive Device crutches, axillary    Distance in Feet 10 feet    Comment educated pt on crutch training with WBAT R LE       Stairs Training    Comment reviewed verbally and demonstrated visually       PT Clinical Impression    Patient's Goals For Discharge return home;take care of myself at home;return to work;return to all previous roles/activities    Plan For Care Reviewed: Physical Therapy patient voices agreement with PT plan for care;patient feedback incorporated in PT plan for care;PT plan for care discussed with patient    Rehab Potential/Prognosis good, to achieve stated therapy goals   none d/c PT    Problem List pain    Anticipated Equipment Needs at Discharge --   AC issued    Expected Discharge Disposition home    Daily Outcome Statement pt ind with mobility with AC, able to demonstrate proper technique. pt appropriate for home post procedure                   Education provided this session. See the Patient Education summary report for full details.

## 2018-06-18 NOTE — ANESTHESIA PREPROCEDURE EVALUATION
Anesthesia ROS/MED HX    Anesthesia History    Previous anesthetics   History of anesthetic complications (urinary retention, unable to DL last anesthetic converted to glide X1 with G2bV)  Pulmonary    tobacco use (current every day)  Neuro/Psych - neg  Cardiovascular- neg  GI/Hepatic   GERD    Control: well controlled  Musculoskeletal- neg  Endo/Other- neg   Body Habitus: Obese      Past Surgical History:   Procedure Laterality Date   • HERNIA REPAIR  05/04/2018    umbilical   • TONSILLECTOMY         Physical Exam    Airway   Mallampati: III   TM distance: >3 FB   Neck ROM: full  Cardiovascular - normal   Rhythm: regular   Rate: normal  Pulmonary - normal   clear to auscultation  Other Findings   Full beard  Dental    Teeth Problems: chipped          Anesthesia Plan    Plan: general    Technique: general LMA     Lines and Monitors: PIV   ASA 2  Anesthetic plan and risks discussed with: patient and spouse  Induction:    intravenous   Postop Plan:   Patient Disposition: phase II then home   Pain Management: IV analgesics      Lab Results   Component Value Date    WBC 10.01 04/13/2018    HGB 16.2 04/13/2018    HCT 45.2 04/13/2018    MCV 86.4 04/13/2018     04/13/2018       Lab Results   Component Value Date    GLUCOSE 91 04/13/2018    CALCIUM 9.4 04/13/2018     04/13/2018    K 3.7 05/04/2018    CO2 23 04/13/2018     04/13/2018    BUN 15 04/13/2018    CREATININE 0.9 04/13/2018                 Patient Active Problem List   Diagnosis   (none) - all problems resolved or deleted       Past Surgical History:   Procedure Laterality Date   • HERNIA REPAIR  05/04/2018    umbilical   • TONSILLECTOMY

## 2018-06-18 NOTE — ANESTHESIA POSTPROCEDURE EVALUATION
Patient: Alcides Reilly    Procedure Summary     Date:  06/18/18 Room / Location:  [facility]    Anesthesia Start:  1229 Anesthesia Stop:  1327    Procedure:  Right Knee Arthroscopy, partial synovectomy and resection of plica (Right ) Diagnosis:  (Medial Meniscus Tear S89.209S)    Surgeon:  Kassandra Almonte DO Responsible Provider:  Jael Ochoa MD    Anesthesia Type:  general ASA Status:  2          Anesthesia Type: general  PACU Vitals  6/18/2018 1322 - 6/18/2018 1412      6/18/2018 1330 6/18/2018 1345 6/18/2018 1400       BP: 122/71 124/60 118/61     Temp: 36.6 °C (97.8 °F) - -     Pulse: 93 96 93     Resp: 14 13 15     SpO2: 95 % 98 % 98 %             Anesthesia Post Evaluation    Pain management: adequate  Patient location during evaluation: PACU  Patient participation: complete - patient participated  Level of consciousness: awake and alert  Cardiovascular status: acceptable  Airway Patency: adequate  Respiratory status: acceptable  Hydration status: acceptable  Anesthetic complications: no

## 2018-06-18 NOTE — ANESTHESIA PROCEDURE NOTES
Airway  Urgency: elective    Start Time: 6/18/2018 12:39 PM    General Information and Staff    Patient location during procedure: OR  Anesthesiologist: ZOHRA LINTON  Resident/CRNA: OLVIN BARRERA  Performed: resident/CRNA     Indications and Patient Condition  Indications for airway management: anesthesia  Sedation level: deep  Preoxygenated: yes  Patient position: sniffing  Mask difficulty assessment: 1 - vent by mask    Final Airway Details  Final airway type: supraglottic airway (LMA)      Successful airway: iGel  Size 5    Number of attempts at approach: 1  Number of other approaches attempted: 0  Atraumatic airway insertion

## 2018-11-29 ENCOUNTER — OFFICE VISIT (OUTPATIENT)
Dept: VASCULAR SURGERY | Facility: CLINIC | Age: 33
End: 2018-11-29
Payer: COMMERCIAL

## 2018-11-29 DIAGNOSIS — I73.9 PAD (PERIPHERAL ARTERY DISEASE) (CMS/HCC): Primary | ICD-10-CM

## 2018-11-29 PROCEDURE — 99204 OFFICE O/P NEW MOD 45 MIN: CPT | Performed by: SURGERY

## 2018-11-29 NOTE — ASSESSMENT & PLAN NOTE
33-year-old  who suffered a hyperextension injury to the right knee.  He required arthroscopic surgery and since his surgical intervention he continues have pain in the knee and this is associated with swelling in the right lower extremity.    Ultrasound is apparently negative for DVT.  His vascular examination shows no arterial insufficiency or current evidence for severe venous disease.    I recommend he continue with further evaluation of his knee and follow-up in this office after using compression stockings in approximately 2 months at which time a new ultrasound will be performed.

## 2018-11-29 NOTE — LETTER
November 29, 2018     Myke Briceño DO  [address] [facility] [address]    Patient: Alcides Reilly   YOB: 1985   Date of Visit: 11/29/2018       Dear Dr. Briceño:    Thank you for referring Alcides Reilly to me for evaluation. Below are my notes for this consultation.    If you have questions, please do not hesitate to call me. I look forward to following your patient along with you.         Sincerely,        Todd Shanks MD FACS        CC: DO Rimma Bro Alexander, MD FACS  11/29/2018  4:26 PM  Sign at close encounter     [facility]   [address] [place] [address]  P: [phone]     F: [phone]       Vascular Surgery Consult Note    SUBJECTIVE     HPI     Patient is a 33 y.o. male who presents with postoperative swelling right knee.  Patient is a [place]  who suffered traumatic injury to the right knee requiring arthroscopic intervention.  He states that since his surgical intervention he has not had significant improvement in his knee discomfort and is also had right lower extremity swelling which has become uncomfortable feeling a sensation of tightness in the calf.    He has history of venous ultrasound being performed showing no evidence of deep venous thrombosis.  His main pain is in his knee he has no lower extremity wounds areas of tenderness.    His knee pain is generally on the medial aspect of his right knee..     Review of Systems  Systemic: No fever, no chills, and no recent weight change. No headache.  Neck: No neck pain, no neck stiffness, and no lump or swelling in the neck.  Otolaryngeal: no hoarseness, no dysphagia.   Cardiovascular: No chest pain or discomfort, no palpitations.  Respiratory: No wheezing.  Gastrointestinal: Appetite without significant change. No dysphagia, no active abdominal pain, and no melena. No bright red blood per rectum.  Genitourinary: No hematuria, No genital lesion.  No obvious  bladder changes.   Endocrine: No polydipsia and no excessive sweating.  Hematologic: No easy bleeding and no tendency for easy bruising.   Integumentary: No obvious masses  Musculoskeletal: No new muscle tenderness.  Neurological: No new motor disturbances, or sensory disturbances.   Psychological: Appropriate.  Skin: No pruritus. No skin lesions and no rash    Objective       Medical History:   Past Medical History:   Diagnosis Date   • High cholesterol    • Postoperative urinary retention    • Snores        Surgical History:   Past Surgical History:   Procedure Laterality Date   • HERNIA REPAIR  05/04/2018    umbilical   • KNEE SURGERY Right 06/2018   • TONSILLECTOMY         Social History:   Social History     Social History Narrative   • No narrative on file       Family History:   Family History   Problem Relation Age of Onset   • Throat cancer Father    • Hypertension Father        Allergies: Bactrim [sulfamethoxazole-trimethoprim]    Current Medications:      Vital Signs:  @VT@    Physicial Exam    General appearance: alert, appears stated age and cooperative  Head: normocephalic, without obvious abnormality, atraumatic  Neck: supple, symmetrical, trachea midline.  There is no JVD.  Carotid arteries have good upstroke without carotid bruits.  There is no lymphadenopathy.  Heart: Regular and rhythmic without murmur gallop.  Lungs: clear to auscultation bilaterally  Chest wall: no tenderness  Back: symmetric, no curvature. ROM normal. No CVA tenderness  Abdomen: soft, non-tender; bowel sounds normal; no masses, no organomegaly.  No hernias no intra-abdominal bruits.  Extremities: extremities warm, no cyanosis, clubbing.  Mild edema at this time in the right lower extremity below the knee.  There is some discoloration along the medial malleolus.  Pulses: 2+ and symmetric  Skin: Skin color, texture, turgor normal. No rashes or lesions  Neurologic: Grossly normal    Labs/Imaging     Labs    No new  labs.    Imaging  No new imaging results.    Assessment and Plan     Problem List Items Addressed This Visit     PAD (peripheral artery disease) (CMS/HCC) (HCC) - Primary    Current Assessment & Plan     33-year-old  who suffered a hyperextension injury to the right knee.  He required arthroscopic surgery and since his surgical intervention he continues have pain in the knee and this is associated with swelling in the right lower extremity.    Ultrasound is apparently negative for DVT.  His vascular examination shows no arterial insufficiency or current evidence for severe venous disease.    I recommend he continue with further evaluation of his knee and follow-up in this office after using compression stockings in approximately 2 months at which time a new ultrasound will be performed.         Relevant Orders    Ultrasound arterial leg right            Todd Shanks MD, FACS  Chief, Division of Vascular Surgery  [facility]      Thank you very much for allowing us to participate in the care of your patient.  I will keep you informed of Alcides Reilly's care.  Please not hesitate to call or email if there are any questions.  I can be reached at [phone](mobile) or [email or web address].  Sincerely.    Jaret Shanks

## 2018-11-29 NOTE — PROGRESS NOTES
[facility]   [address] [place] [address] [place] [address]  P: [phone]     F: [phone]       Vascular Surgery Consult Note    SUBJECTIVE     HPI     Patient is a 33 y.o. male who presents with postoperative swelling right knee.  Patient is a [place]  who suffered traumatic injury to the right knee requiring arthroscopic intervention.  He states that since his surgical intervention he has not had significant improvement in his knee discomfort and is also had right lower extremity swelling which has become uncomfortable feeling a sensation of tightness in the calf.    He has history of venous ultrasound being performed showing no evidence of deep venous thrombosis.  His main pain is in his knee he has no lower extremity wounds areas of tenderness.    His knee pain is generally on the medial aspect of his right knee..     Review of Systems  Systemic: No fever, no chills, and no recent weight change. No headache.  Neck: No neck pain, no neck stiffness, and no lump or swelling in the neck.  Otolaryngeal: no hoarseness, no dysphagia.   Cardiovascular: No chest pain or discomfort, no palpitations.  Respiratory: No wheezing.  Gastrointestinal: Appetite without significant change. No dysphagia, no active abdominal pain, and no melena. No bright red blood per rectum.  Genitourinary: No hematuria, No genital lesion.  No obvious bladder changes.   Endocrine: No polydipsia and no excessive sweating.  Hematologic: No easy bleeding and no tendency for easy bruising.   Integumentary: No obvious masses  Musculoskeletal: No new muscle tenderness.  Neurological: No new motor disturbances, or sensory disturbances.   Psychological: Appropriate.  Skin: No pruritus. No skin lesions and no rash    Objective       Medical History:   Past Medical History:   Diagnosis Date   • High cholesterol    • Postoperative urinary retention    • Snores        Surgical History:   Past Surgical History:    Procedure Laterality Date   • HERNIA REPAIR  05/04/2018    umbilical   • KNEE SURGERY Right 06/2018   • TONSILLECTOMY         Social History:   Social History     Social History Narrative   • No narrative on file       Family History:   Family History   Problem Relation Age of Onset   • Throat cancer Father    • Hypertension Father        Allergies: Bactrim [sulfamethoxazole-trimethoprim]    Current Medications:      Vital Signs:  @VT@    Physicial Exam    General appearance: alert, appears stated age and cooperative  Head: normocephalic, without obvious abnormality, atraumatic  Neck: supple, symmetrical, trachea midline.  There is no JVD.  Carotid arteries have good upstroke without carotid bruits.  There is no lymphadenopathy.  Heart: Regular and rhythmic without murmur gallop.  Lungs: clear to auscultation bilaterally  Chest wall: no tenderness  Back: symmetric, no curvature. ROM normal. No CVA tenderness  Abdomen: soft, non-tender; bowel sounds normal; no masses, no organomegaly.  No hernias no intra-abdominal bruits.  Extremities: extremities warm, no cyanosis, clubbing.  Mild edema at this time in the right lower extremity below the knee.  There is some discoloration along the medial malleolus.  Pulses: 2+ and symmetric  Skin: Skin color, texture, turgor normal. No rashes or lesions  Neurologic: Grossly normal    Labs/Imaging     Labs    No new labs.    Imaging  No new imaging results.    Assessment and Plan     Problem List Items Addressed This Visit     PAD (peripheral artery disease) (CMS/[facility]) ([facility]) - Primary    Current Assessment & Plan     33-year-old  who suffered a hyperextension injury to the right knee.  He required arthroscopic surgery and since his surgical intervention he continues have pain in the knee and this is associated with swelling in the right lower extremity.    Ultrasound is apparently negative for DVT.  His vascular examination shows no arterial insufficiency or current  evidence for severe venous disease.    I recommend he continue with further evaluation of his knee and follow-up in this office after using compression stockings in approximately 2 months at which time a new ultrasound will be performed.         Relevant Orders    Ultrasound arterial leg right            Todd Shanks MD, FACS  Chief, Division of Vascular Surgery  [facility]      Thank you very much for allowing us to participate in the care of your patient.  I will keep you informed of Alcides Reilly's care.  Please not hesitate to call or email if there are any questions.  I can be reached at [phone](mobile) or [email or web address].  Sincerely.    Jaret Shanks

## 2019-01-22 ENCOUNTER — TELEPHONE (OUTPATIENT)
Dept: CARDIOLOGY | Facility: HOSPITAL | Age: 34
End: 2019-01-22

## 2019-01-24 ENCOUNTER — HOSPITAL ENCOUNTER (OUTPATIENT)
Dept: CARDIOLOGY | Facility: HOSPITAL | Age: 34
Discharge: HOME | End: 2019-01-24
Attending: SURGERY
Payer: COMMERCIAL

## 2019-01-24 ENCOUNTER — OFFICE VISIT (OUTPATIENT)
Dept: VASCULAR SURGERY | Facility: CLINIC | Age: 34
End: 2019-01-24
Payer: COMMERCIAL

## 2019-01-24 VITALS
SYSTOLIC BLOOD PRESSURE: 105 MMHG | OXYGEN SATURATION: 98 % | RESPIRATION RATE: 17 BRPM | HEART RATE: 58 BPM | DIASTOLIC BLOOD PRESSURE: 70 MMHG

## 2019-01-24 VITALS — HEIGHT: 71 IN | WEIGHT: 255 LBS | BODY MASS INDEX: 35.7 KG/M2

## 2019-01-24 DIAGNOSIS — I73.9 PAD (PERIPHERAL ARTERY DISEASE) (CMS/HCC): ICD-10-CM

## 2019-01-24 DIAGNOSIS — I73.9 PAD (PERIPHERAL ARTERY DISEASE) (CMS/HCC): Primary | ICD-10-CM

## 2019-01-24 LAB
BSA FOR ECHO PROCEDURE: 2.41 M2
BSA FOR ECHO PROCEDURE: 2.41 M2
IMMEDIATE ARM BP: 150 MMHG
IMMEDIATE LEFT ABI: 1.11
IMMEDIATE LEFT TIBIAL: 166 MMHG
IMMEDIATE RIGHT ABI: 1.12
IMMEDIATE RIGHT TIBIAL: 168 MMHG
LEFT 1ST TOE INDEX: 1.11
LEFT 1ST TOE: 168 MMHG
LEFT ABI: 1.13
LEFT ARM BP: 151 MMHG
LEFT DORSALIS PEDIS INDEX: 1.13
LEFT DORSALIS PEDIS: 171 MMHG
LEFT LOW THIGH INDEX: 1.19
LEFT LOW THIGH: 180 MMHG
LEFT POST TIBIAL INDEX: 1.07
LEFT POSTERIOR TIBIAL: 162 MMHG
LEFT PROXIMAL CALF INDEX: 1.09
LEFT PROXIMAL CALF: 164 MMHG
LEFT TBI: 1.11
LT BRACHIAL PRESSURE: 151 MMHG
RIGHT 1ST TOE INDEX: 0.93
RIGHT 1ST TOE: 140 MMHG
RIGHT ABI: 1.14
RIGHT ARM BP: 146 MMHG
RIGHT DORSALIS PEDIS INDEX: 1.13
RIGHT DORSALIS PEDIS: 170 MMHG
RIGHT LOW THIGH INDEX: 1.17
RIGHT LOW THIGH: 177 MMHG
RIGHT POST TIBIAL INDEX: 1.14
RIGHT POSTERIOR TIBIAL: 172 MMHG
RIGHT PROXIMAL CALF INDEX: 1.06
RIGHT PROXIMAL CALF: 160 MMHG
RIGHT TBI: 0.93
RT ANT TIBIAL PROX PSV: 41.72 CM/S
RT BRACHIAL PRESSURE: 146 MMHG
RT CFA PROX PSV: 54.84 CM/S
RT CFA PROX RATIO: 0.52
RT EXT ILIAC DISTAL PSV: 105.81 CM/S
RT PERONEAL MID PSV: 20.34 CM/S
RT POPLITEAL PROX PSV: 46.45 CM/S
RT POPLITEAL PROX RATIO: 0.66
RT POST TIBIAL MID PSV: 65.16 CM/S
RT PROFUNDA PROX PSV: 53.87 CM/S
RT SFA DIST PSV: 70.65 CM/S
RT SFA MID PSV: 92.9 CM/S
RT SFA PROX PSV: 88.6 CM/S
TOE RAISES: 3

## 2019-01-24 PROCEDURE — 93923 UPR/LXTR ART STDY 3+ LVLS: CPT

## 2019-01-24 PROCEDURE — 93923 UPR/LXTR ART STDY 3+ LVLS: CPT | Mod: 26 | Performed by: INTERNAL MEDICINE

## 2019-01-24 PROCEDURE — 99214 OFFICE O/P EST MOD 30 MIN: CPT | Performed by: SURGERY

## 2019-01-24 PROCEDURE — 93926 LOWER EXTREMITY STUDY: CPT | Mod: RT

## 2019-01-24 PROCEDURE — 93926 LOWER EXTREMITY STUDY: CPT | Mod: 26,RT | Performed by: INTERNAL MEDICINE

## 2019-01-24 NOTE — PROGRESS NOTES
[facility]   [address] [place] [address] [place] [address]  P: [phone]     F: [phone]       Vascular Surgery Follow up Visit    Subjective     Patient is a 33 y.o. male who has a past history of injury to the right knee requiring arthroscopic evaluation.  Alcides Reilly was last seen on November 29, 2018.  Alcides Reilly presents at this time for follow-up evaluation with arterial noninvasive studies    Review of Systems  Systemic: No fever, no chills, and no recent weight change. No headache.  Neck: No neck pain, no neck stiffness, and no lump or swelling in the neck.  Otolaryngeal: no hoarseness, no dysphagia.   Cardiovascular: No chest pain or discomfort, no palpitations.  Respiratory: No wheezing.  Gastrointestinal: Appetite without significant change. No dysphagia, no active abdominal pain, and no melena. No bright red blood per rectum.  Genitourinary: No hematuria, No genital lesion.  No obvious bladder changes.   Endocrine: No polydipsia and no excessive sweating.  Hematologic: No easy bleeding and no tendency for easy bruising.   Integumentary: No obvious masses  Musculoskeletal: No new muscle tenderness.  New right knee pain with what he terms as clicking and grinding sensation.  Neurological: No new motor disturbances, or sensory disturbances.  In use with cold sensation and some numbness in the right foot  Psychological: Appropriate.  Skin: No pruritus. No skin lesions and no rash    Objective     Medical History:   Past Medical History:   Diagnosis Date   • High cholesterol    • Postoperative urinary retention    • Snores        Surgical History:   Past Surgical History:   Procedure Laterality Date   • HERNIA REPAIR  05/04/2018    umbilical   • KNEE SURGERY Right 06/2018   • TONSILLECTOMY         Social History:   Social History     Social History Narrative   • No narrative on file       Family History:   Family History   Problem Relation Age of Onset   • Throat cancer Father    •  Hypertension Father        Allergies: Bactrim [sulfamethoxazole-trimethoprim]    Current Medications:      Vital Signs:  1/24/2019    Physical Exam     General appearance: alert, appears stated age and cooperative  Head: normocephalic, without obvious abnormality, atraumatic  Neck: supple, symmetrical, trachea midline.  There is no JVD.  Carotid arteries have good upstroke without carotid bruits.  There is no lymphadenopathy.  Heart: Regular and rhythmic without murmur gallop.  Lungs: clear to auscultation bilaterally  Chest wall: no tenderness  Back: symmetric, no curvature. ROM normal. No CVA tenderness  Abdomen: soft, non-tender; bowel sounds normal; no masses, no organomegaly.  No hernias.  No intra-abdominal bruits.  Extremities: extremities warm, no cyanosis, clubbing, or edema  Pulses: 2+ and symmetric  Skin: Skin color, texture, turgor normal. No rashes or lesions  Neurologic: Grossly normal    Labs/Imaging     Labs  No new labs.    Imaging  Arterial lower extremity evaluation shows no evidence of peripheral vascular disease.  ABIs had normal pressures and normal Doppler waveforms.  Duplex evaluation shows no evidence of stenosis or dissection.    Assessment and Plan       Problem List Items Addressed This Visit     PAD (peripheral artery disease) (CMS/HCC) ([facility]) - Primary    Current Assessment & Plan     Patient continues to have a cold sensation in his right foot and what he terms as grinding and clicking sensation in his right knee.    Noninvasive vascular evaluation shows no evidence of venous or arterial disease..  My impression is that he may have suffered a traction nerve injury and most likely continues to have some skeletal issues with his right knee.    I have asked him to follow-up in this office as needed.               Todd Shanks MD, FACS  Chief, Division of Vascular Surgery  [facility]      Thank you very much for allowing us to participate in the care of your patient.  I will keep  you informed of Alcides Reilly's care.  Please not hesitate to call or email if there are any questions.  I can be reached at [phone](mobile) or [email or web address].    Sincerely.    [facility]   [address] [place], [place] [address]PETE[place] Velasquez [address]  P: [phone]     F: [phone]       Vascular Surgery Consult Note    SUBJECTIVE     HPI     Patient is a 33 y.o. male who presents with postoperative swelling right knee.  Patient is a [place]  who suffered traumatic injury to the right knee requiring arthroscopic intervention.  He states that since his surgical intervention he has not had significant improvement in his knee discomfort and is also had right lower extremity swelling which has become uncomfortable feeling a sensation of tightness in the calf.    He has history of venous ultrasound being performed showing no evidence of deep venous thrombosis.  His main pain is in his knee he has no lower extremity wounds areas of tenderness.    His knee pain is generally on the medial aspect of his right knee.    He is here for follow-up evaluation after a repeat venous ultrasound.    Review of Systems  Systemic: No fever, no chills, and no recent weight change. No headache.  Neck: No neck pain, no neck stiffness, and no lump or swelling in the neck.  Otolaryngeal: no hoarseness, no dysphagia.   Cardiovascular: No chest pain or discomfort, no palpitations.  Respiratory: No wheezing.  Gastrointestinal: Appetite without significant change. No dysphagia, no active abdominal pain, and no melena. No bright red blood per rectum.  Genitourinary: No hematuria, No genital lesion.  No obvious bladder changes.   Endocrine: No polydipsia and no excessive sweating.  Hematologic: No easy bleeding and no tendency for easy bruising.   Integumentary: No obvious masses  Musculoskeletal: No new muscle tenderness.  Neurological: No new motor disturbances, or sensory disturbances.   Psychological:  Appropriate.  Skin: No pruritus. No skin lesions and no rash    Objective       Medical History:   Past Medical History:   Diagnosis Date   • High cholesterol    • Postoperative urinary retention    • Snores        Surgical History:   Past Surgical History:   Procedure Laterality Date   • HERNIA REPAIR  05/04/2018    umbilical   • KNEE SURGERY Right 06/2018   • TONSILLECTOMY         Social History:   Social History     Social History Narrative   • No narrative on file       Family History:   Family History   Problem Relation Age of Onset   • Throat cancer Father    • Hypertension Father        Allergies: Bactrim [sulfamethoxazole-trimethoprim]    Current Medications:      Vital Signs:  @VT@    Physicial Exam    General appearance: alert, appears stated age and cooperative  Head: normocephalic, without obvious abnormality, atraumatic  Neck: supple, symmetrical, trachea midline.  There is no JVD.  Carotid arteries have good upstroke without carotid bruits.  There is no lymphadenopathy.  Heart: Regular and rhythmic without murmur gallop.  Lungs: clear to auscultation bilaterally  Chest wall: no tenderness  Back: symmetric, no curvature. ROM normal. No CVA tenderness  Abdomen: soft, non-tender; bowel sounds normal; no masses, no organomegaly.  No hernias no intra-abdominal bruits.  Extremities: extremities warm, no cyanosis, clubbing.  Mild edema at this time in the right lower extremity below the knee.  There is some discoloration along the medial malleolus.  Pulses: 2+ and symmetric  Skin: Skin color, texture, turgor normal. No rashes or lesions  Neurologic: Grossly normal    Labs/Imaging     Labs    No new labs.    Imaging  No new imaging results.    Assessment and Plan     Problem List Items Addressed This Visit     PAD (peripheral artery disease) (CMS/[facility]) ([facility]) - Primary    Current Assessment & Plan     Patient continues to have a cold sensation in his right foot and what he terms as grinding and clicking  sensation in his right knee.    Noninvasive vascular evaluation shows no evidence of venous or arterial disease..  My impression is that he may have suffered a traction nerve injury and most likely continues to have some skeletal issues with his right knee.    I have asked him to follow-up in this office as needed.                 Todd Shanks MD, FACS  Chief, Division of Vascular Surgery  [facility]      Thank you very much for allowing us to participate in the care of your patient.  I will keep you informed of Alcides Reilly's care.  Please not hesitate to call or email if there are any questions.  I can be reached at [phone](mobile) or [email or web address].  Sincerely.    Jaret Shanks

## 2019-01-24 NOTE — ASSESSMENT & PLAN NOTE
Patient continues to have a cold sensation in his right foot and what he terms as grinding and clicking sensation in his right knee.    Noninvasive vascular evaluation shows no evidence of venous or arterial disease..  My impression is that he may have suffered a traction nerve injury and most likely continues to have some skeletal issues with his right knee.    I have asked him to follow-up in this office as needed.

## 2019-01-24 NOTE — LETTER
January 24, 2019     Myke Briceño DO  [address]  [facility] [address]    Patient: Alcides Reilly   YOB: 1985   Date of Visit: 1/24/2019       Dear Dr. Briceño:    Thank you for referring Alcides Reilly to me for evaluation. Below are my notes for this consultation.    If you have questions, please do not hesitate to call me. I look forward to following your patient along with you.         Sincerely,        Todd Shanks MD FACS        CC: DO Rimma Bro Alexander, MD FACS  1/24/2019 12:54 PM  Sign at close encounter     [facility]   [address] [facility] [place] [address]  P: [phone]     F: [phone]       Vascular Surgery Follow up Visit    Subjective     Patient is a 33 y.o. male who has a past history of injury to the right knee requiring arthroscopic evaluation.  Alcides Reilly was last seen on November 29, 2018.  Alcides Reilly presents at this time for follow-up evaluation with arterial noninvasive studies    Review of Systems  Systemic: No fever, no chills, and no recent weight change. No headache.  Neck: No neck pain, no neck stiffness, and no lump or swelling in the neck.  Otolaryngeal: no hoarseness, no dysphagia.   Cardiovascular: No chest pain or discomfort, no palpitations.  Respiratory: No wheezing.  Gastrointestinal: Appetite without significant change. No dysphagia, no active abdominal pain, and no melena. No bright red blood per rectum.  Genitourinary: No hematuria, No genital lesion.  No obvious bladder changes.   Endocrine: No polydipsia and no excessive sweating.  Hematologic: No easy bleeding and no tendency for easy bruising.   Integumentary: No obvious masses  Musculoskeletal: No new muscle tenderness.  New right knee pain with what he terms as clicking and grinding sensation.  Neurological: No new motor disturbances, or sensory disturbances.  In use with cold sensation and some numbness in the right foot  Psychological:  Appropriate.  Skin: No pruritus. No skin lesions and no rash    Objective     Medical History:   Past Medical History:   Diagnosis Date   • High cholesterol    • Postoperative urinary retention    • Snores        Surgical History:   Past Surgical History:   Procedure Laterality Date   • HERNIA REPAIR  05/04/2018    umbilical   • KNEE SURGERY Right 06/2018   • TONSILLECTOMY         Social History:   Social History     Social History Narrative   • No narrative on file       Family History:   Family History   Problem Relation Age of Onset   • Throat cancer Father    • Hypertension Father        Allergies: Bactrim [sulfamethoxazole-trimethoprim]    Current Medications:      Vital Signs:  1/24/2019    Physical Exam     General appearance: alert, appears stated age and cooperative  Head: normocephalic, without obvious abnormality, atraumatic  Neck: supple, symmetrical, trachea midline.  There is no JVD.  Carotid arteries have good upstroke without carotid bruits.  There is no lymphadenopathy.  Heart: Regular and rhythmic without murmur gallop.  Lungs: clear to auscultation bilaterally  Chest wall: no tenderness  Back: symmetric, no curvature. ROM normal. No CVA tenderness  Abdomen: soft, non-tender; bowel sounds normal; no masses, no organomegaly.  No hernias.  No intra-abdominal bruits.  Extremities: extremities warm, no cyanosis, clubbing, or edema  Pulses: 2+ and symmetric  Skin: Skin color, texture, turgor normal. No rashes or lesions  Neurologic: Grossly normal    Labs/Imaging     Labs  No new labs.    Imaging  Arterial lower extremity evaluation shows no evidence of peripheral vascular disease.  ABIs had normal pressures and normal Doppler waveforms.  Duplex evaluation shows no evidence of stenosis or dissection.    Assessment and Plan       Problem List Items Addressed This Visit     PAD (peripheral artery disease) (CMS/[facility]) ([facility]) - Primary    Current Assessment & Plan     Patient continues to have a cold sensation  in his right foot and what he terms as grinding and clicking sensation in his right knee.    Noninvasive vascular evaluation shows no evidence of venous or arterial disease..  My impression is that he may have suffered a traction nerve injury and most likely continues to have some skeletal issues with his right knee.    I have asked him to follow-up in this office as needed.               Todd Shanks MD, FACS  Chief, Division of Vascular Surgery  [facility]      Thank you very much for allowing us to participate in the care of your patient.  I will keep you informed of Alcides Reilly's care.  Please not hesitate to call or email if there are any questions.  I can be reached at [phone](mobile) or [email or web address].    Sincerely.    Jaret Shanks       [facility]   [address] [place] [address]  [place] [address]  P: [phone]     F: [phone]       Vascular Surgery Consult Note    SUBJECTIVE     HPI     Patient is a 33 y.o. male who presents with postoperative swelling right knee.  Patient is a [place]  who suffered traumatic injury to the right knee requiring arthroscopic intervention.  He states that since his surgical intervention he has not had significant improvement in his knee discomfort and is also had right lower extremity swelling which has become uncomfortable feeling a sensation of tightness in the calf.    He has history of venous ultrasound being performed showing no evidence of deep venous thrombosis.  His main pain is in his knee he has no lower extremity wounds areas of tenderness.    His knee pain is generally on the medial aspect of his right knee.    He is here for follow-up evaluation after a repeat venous ultrasound.    Review of Systems  Systemic: No fever, no chills, and no recent weight change. No headache.  Neck: No neck pain, no neck stiffness, and no lump or swelling in the neck.  Otolaryngeal: no hoarseness, no dysphagia.   Cardiovascular: No  chest pain or discomfort, no palpitations.  Respiratory: No wheezing.  Gastrointestinal: Appetite without significant change. No dysphagia, no active abdominal pain, and no melena. No bright red blood per rectum.  Genitourinary: No hematuria, No genital lesion.  No obvious bladder changes.   Endocrine: No polydipsia and no excessive sweating.  Hematologic: No easy bleeding and no tendency for easy bruising.   Integumentary: No obvious masses  Musculoskeletal: No new muscle tenderness.  Neurological: No new motor disturbances, or sensory disturbances.   Psychological: Appropriate.  Skin: No pruritus. No skin lesions and no rash    Objective       Medical History:   Past Medical History:   Diagnosis Date   • High cholesterol    • Postoperative urinary retention    • Snores        Surgical History:   Past Surgical History:   Procedure Laterality Date   • HERNIA REPAIR  05/04/2018    umbilical   • KNEE SURGERY Right 06/2018   • TONSILLECTOMY         Social History:   Social History     Social History Narrative   • No narrative on file       Family History:   Family History   Problem Relation Age of Onset   • Throat cancer Father    • Hypertension Father        Allergies: Bactrim [sulfamethoxazole-trimethoprim]    Current Medications:      Vital Signs:  @VT@    Physicial Exam    General appearance: alert, appears stated age and cooperative  Head: normocephalic, without obvious abnormality, atraumatic  Neck: supple, symmetrical, trachea midline.  There is no JVD.  Carotid arteries have good upstroke without carotid bruits.  There is no lymphadenopathy.  Heart: Regular and rhythmic without murmur gallop.  Lungs: clear to auscultation bilaterally  Chest wall: no tenderness  Back: symmetric, no curvature. ROM normal. No CVA tenderness  Abdomen: soft, non-tender; bowel sounds normal; no masses, no organomegaly.  No hernias no intra-abdominal bruits.  Extremities: extremities warm, no cyanosis, clubbing.  Mild edema at this  time in the right lower extremity below the knee.  There is some discoloration along the medial malleolus.  Pulses: 2+ and symmetric  Skin: Skin color, texture, turgor normal. No rashes or lesions  Neurologic: Grossly normal    Labs/Imaging     Labs    No new labs.    Imaging  No new imaging results.    Assessment and Plan     Problem List Items Addressed This Visit     PAD (peripheral artery disease) (CMS/HCC) ([facility]) - Primary    Current Assessment & Plan     Patient continues to have a cold sensation in his right foot and what he terms as grinding and clicking sensation in his right knee.    Noninvasive vascular evaluation shows no evidence of venous or arterial disease..  My impression is that he may have suffered a traction nerve injury and most likely continues to have some skeletal issues with his right knee.    I have asked him to follow-up in this office as needed.                 Todd Shanks MD, FACS  Chief, Division of Vascular Surgery  [facility]      Thank you very much for allowing us to participate in the care of your patient.  I will keep you informed of Alcides Reilly's care.  Please not hesitate to call or email if there are any questions.  I can be reached at [phone](mobile) or [email or web address].  Sincerely.    Jaret Shanks

## 2023-05-24 ENCOUNTER — CONSULT (OUTPATIENT)
Dept: SURGERY | Facility: CLINIC | Age: 38
End: 2023-05-24

## 2023-05-24 VITALS
WEIGHT: 230 LBS | DIASTOLIC BLOOD PRESSURE: 88 MMHG | TEMPERATURE: 97.9 F | HEART RATE: 94 BPM | BODY MASS INDEX: 32.2 KG/M2 | HEIGHT: 71 IN | SYSTOLIC BLOOD PRESSURE: 125 MMHG

## 2023-05-24 DIAGNOSIS — K40.20 NON-RECURRENT BILATERAL INGUINAL HERNIA WITHOUT OBSTRUCTION OR GANGRENE: Primary | ICD-10-CM

## 2023-05-24 NOTE — ASSESSMENT & PLAN NOTE
51-year-old male presents with bilateral inguinal hernias  Plan:  - Will plan on laparoscopic or robotic bilateral inguinal hernia repair with mesh  Risks and benefits of surgery were reviewed and the patient was amenable  - To be cognizant of the fact that he has a prior periumbilical hernia repair with mesh done robotically  - Will schedule at the patient's earliest convenience  - Patient was counseled of the signs of symptoms of bowel incarceration was advised to call immediately if any of those symptoms should arise

## 2023-05-24 NOTE — H&P (VIEW-ONLY)
Assessment/Plan:    Bilateral inguinal hernia without obstruction or gangrene  69-year-old male presents with bilateral inguinal hernias  Plan:  - Will plan on laparoscopic or robotic bilateral inguinal hernia repair with mesh  Risks and benefits of surgery were reviewed and the patient was amenable  - To be cognizant of the fact that he has a prior periumbilical hernia repair with mesh done robotically  - Will schedule at the patient's earliest convenience  - Patient was counseled of the signs of symptoms of bowel incarceration was advised to call immediately if any of those symptoms should arise  Diagnoses and all orders for this visit:    Non-recurrent bilateral inguinal hernia without obstruction or gangrene          Subjective:      Patient ID: Geofm Brenna is a 45 y o  male  69-year-old male presents with bilateral inguinal hernias  Patient states that he is been complaining of several weeks of left groin pain, descending into his left hemiscrotum  He states that this is been getting worse with activity, to the point where he is unable to do most activities around the house  He denies any significant nausea, vomiting, fevers, chills, chest pain, shortness of breath  He is tolerating regular diet, moving his bowels normally  He recently presented to the emergency department due to significant pain in his groin, at which time he underwent CT scan  I reviewed these results from [place], which showed bilateral fat-containing inguinal hernias  Of note he has a history of robotic periumbilical hernia repair with mesh approximately 4 years ago  The following portions of the patient's history were reviewed and updated as appropriate:   He  has no past medical history on file  He   Patient Active Problem List    Diagnosis Date Noted   • Bilateral inguinal hernia without obstruction or gangrene 05/24/2023     He  has no past surgical history on file  His family history is not on file    He "has no history on file for tobacco use, alcohol use, and drug use  No current outpatient medications on file  No current facility-administered medications for this visit  No current outpatient medications on file prior to visit  No current facility-administered medications on file prior to visit  He has no allergies on file       Review of Systems   Constitutional: Negative for appetite change, chills, diaphoresis and fever  HENT: Negative for nosebleeds and trouble swallowing  Eyes: Negative  Respiratory: Negative for cough, shortness of breath and wheezing  Cardiovascular: Negative for chest pain, palpitations and leg swelling  Gastrointestinal: Positive for abdominal pain  Negative for abdominal distention, nausea and vomiting  Genitourinary: Positive for testicular pain  Negative for difficulty urinating, flank pain and frequency  Musculoskeletal: Negative for arthralgias, joint swelling and myalgias  Skin: Negative for pallor and rash  Neurological: Negative for dizziness, facial asymmetry and speech difficulty  Hematological: Does not bruise/bleed easily  Psychiatric/Behavioral: Negative for agitation and confusion  All other systems reviewed and are negative  Objective:      /88 (BP Location: Left arm, Patient Position: Sitting, Cuff Size: Large)   Pulse 94   Temp 97 9 °F (36 6 °C) (Skin)   Ht 5' 11\" (1 803 m)   Wt 104 kg (230 lb)   BMI 32 08 kg/m²          Physical Exam  Vitals and nursing note reviewed  Constitutional:       General: He is not in acute distress  Appearance: Normal appearance  He is not toxic-appearing  HENT:      Head: Normocephalic and atraumatic  Mouth/Throat:      Mouth: Mucous membranes are moist    Eyes:      Extraocular Movements: Extraocular movements intact  Pupils: Pupils are equal, round, and reactive to light  Cardiovascular:      Rate and Rhythm: Normal rate and regular rhythm        Pulses: Normal " pulses  Pulmonary:      Effort: Pulmonary effort is normal  No respiratory distress  Breath sounds: Normal breath sounds  No wheezing  Abdominal:      General: There is no distension  Palpations: Abdomen is soft  There is no mass  Tenderness: There is no abdominal tenderness  There is no guarding or rebound  Hernia: A hernia is present  Comments: Moderate left inguinal hernia, reducible  Small right inguinal hernia, reducible  Musculoskeletal:         General: No swelling or deformity  Normal range of motion  Cervical back: Normal range of motion and neck supple  Right lower leg: No edema  Left lower leg: No edema  Skin:     General: Skin is warm and dry  Coloration: Skin is not jaundiced  Neurological:      General: No focal deficit present  Mental Status: He is alert and oriented to person, place, and time     Psychiatric:         Mood and Affect: Mood normal          Behavior: Behavior normal

## 2023-05-24 NOTE — PROGRESS NOTES
Assessment/Plan:    Bilateral inguinal hernia without obstruction or gangrene  77-year-old male presents with bilateral inguinal hernias  Plan:  - Will plan on laparoscopic or robotic bilateral inguinal hernia repair with mesh  Risks and benefits of surgery were reviewed and the patient was amenable  - To be cognizant of the fact that he has a prior periumbilical hernia repair with mesh done robotically  - Will schedule at the patient's earliest convenience  - Patient was counseled of the signs of symptoms of bowel incarceration was advised to call immediately if any of those symptoms should arise  Diagnoses and all orders for this visit:    Non-recurrent bilateral inguinal hernia without obstruction or gangrene          Subjective:      Patient ID: Kelli Browning is a 45 y o  male  77-year-old male presents with bilateral inguinal hernias  Patient states that he is been complaining of several weeks of left groin pain, descending into his left hemiscrotum  He states that this is been getting worse with activity, to the point where he is unable to do most activities around the house  He denies any significant nausea, vomiting, fevers, chills, chest pain, shortness of breath  He is tolerating regular diet, moving his bowels normally  He recently presented to the emergency department due to significant pain in his groin, at which time he underwent CT scan  I reviewed these results from [place], which showed bilateral fat-containing inguinal hernias  Of note he has a history of robotic periumbilical hernia repair with mesh approximately 4 years ago  The following portions of the patient's history were reviewed and updated as appropriate:   He  has no past medical history on file  He   Patient Active Problem List    Diagnosis Date Noted   • Bilateral inguinal hernia without obstruction or gangrene 05/24/2023     He  has no past surgical history on file  His family history is not on file    He "has no history on file for tobacco use, alcohol use, and drug use  No current outpatient medications on file  No current facility-administered medications for this visit  No current outpatient medications on file prior to visit  No current facility-administered medications on file prior to visit  He has no allergies on file       Review of Systems   Constitutional: Negative for appetite change, chills, diaphoresis and fever  HENT: Negative for nosebleeds and trouble swallowing  Eyes: Negative  Respiratory: Negative for cough, shortness of breath and wheezing  Cardiovascular: Negative for chest pain, palpitations and leg swelling  Gastrointestinal: Positive for abdominal pain  Negative for abdominal distention, nausea and vomiting  Genitourinary: Positive for testicular pain  Negative for difficulty urinating, flank pain and frequency  Musculoskeletal: Negative for arthralgias, joint swelling and myalgias  Skin: Negative for pallor and rash  Neurological: Negative for dizziness, facial asymmetry and speech difficulty  Hematological: Does not bruise/bleed easily  Psychiatric/Behavioral: Negative for agitation and confusion  All other systems reviewed and are negative  Objective:      /88 (BP Location: Left arm, Patient Position: Sitting, Cuff Size: Large)   Pulse 94   Temp 97 9 °F (36 6 °C) (Skin)   Ht 5' 11\" (1 803 m)   Wt 104 kg (230 lb)   BMI 32 08 kg/m²          Physical Exam  Vitals and nursing note reviewed  Constitutional:       General: He is not in acute distress  Appearance: Normal appearance  He is not toxic-appearing  HENT:      Head: Normocephalic and atraumatic  Mouth/Throat:      Mouth: Mucous membranes are moist    Eyes:      Extraocular Movements: Extraocular movements intact  Pupils: Pupils are equal, round, and reactive to light  Cardiovascular:      Rate and Rhythm: Normal rate and regular rhythm        Pulses: Normal " pulses  Pulmonary:      Effort: Pulmonary effort is normal  No respiratory distress  Breath sounds: Normal breath sounds  No wheezing  Abdominal:      General: There is no distension  Palpations: Abdomen is soft  There is no mass  Tenderness: There is no abdominal tenderness  There is no guarding or rebound  Hernia: A hernia is present  Comments: Moderate left inguinal hernia, reducible  Small right inguinal hernia, reducible  Musculoskeletal:         General: No swelling or deformity  Normal range of motion  Cervical back: Normal range of motion and neck supple  Right lower leg: No edema  Left lower leg: No edema  Skin:     General: Skin is warm and dry  Coloration: Skin is not jaundiced  Neurological:      General: No focal deficit present  Mental Status: He is alert and oriented to person, place, and time     Psychiatric:         Mood and Affect: Mood normal          Behavior: Behavior normal

## 2023-06-07 NOTE — PRE-PROCEDURE INSTRUCTIONS
No outpatient medications have been marked as taking for the 6/12/23 encounter [facility] Encounter)  Pt verbalizes understanding of the following:    - avoid alcohol & cigarette within 24hrs    - Bathing instructions, has chg, neck down, no genitals  - No lotions, powders, sprays, deodorant, jewelry, body piercings  - No shaving within 24hrs    - DO NOT EAT OR DRINK ANYTHING after midnight on the evening before your procedure including coffee, tea, gum or hard candy     - ONLY SIPS OF WATER with your medications are allowed on the morning of your procedure  - Avoid OTC non-directed Vit/ Suppl/ Herbals 7 days prior to surgery to ensure no drug interactions with perioperative surgical/ anesthetic meds  - Avoid NSAIDs 3 days prior  - Avoid ASA containing products 5 days prior  - Bring a list of meds you take at home with your last dose noted      - Arrange for someone to drive you home after the procedure & stay with you until the next morning  - Bring insurance cards & photo id    - Leave all valuables such as credit cards, money & jewelry at home    - Notify surgeon if you develop any cold symptoms, change in your health history or develop a rash/ open wounds     - Did the surgeon's office give you any other special instructions? No  - Did surgeon require any clearances?  No

## 2023-06-12 ENCOUNTER — HOSPITAL ENCOUNTER (OUTPATIENT)
Facility: HOSPITAL | Age: 38
Setting detail: OUTPATIENT SURGERY
Discharge: HOME | End: 2023-06-12
Attending: SURGERY | Admitting: SURGERY
Payer: COMMERCIAL

## 2023-06-12 ENCOUNTER — ANESTHESIA EVENT (OUTPATIENT)
Dept: PERIOP | Facility: HOSPITAL | Age: 38
End: 2023-06-12
Payer: COMMERCIAL

## 2023-06-12 ENCOUNTER — ANESTHESIA (OUTPATIENT)
Dept: PERIOP | Facility: HOSPITAL | Age: 38
End: 2023-06-12
Payer: COMMERCIAL

## 2023-06-12 VITALS
HEIGHT: 71 IN | HEART RATE: 88 BPM | OXYGEN SATURATION: 95 % | DIASTOLIC BLOOD PRESSURE: 68 MMHG | TEMPERATURE: 97.7 F | WEIGHT: 225 LBS | BODY MASS INDEX: 31.5 KG/M2 | SYSTOLIC BLOOD PRESSURE: 126 MMHG | RESPIRATION RATE: 6 BRPM

## 2023-06-12 DIAGNOSIS — Z98.890 S/P HERNIA REPAIR: Primary | ICD-10-CM

## 2023-06-12 DIAGNOSIS — Z87.19 S/P HERNIA REPAIR: Primary | ICD-10-CM

## 2023-06-12 PROCEDURE — 49651 LAP ING HERNIA REPAIR RECUR: CPT | Performed by: SURGERY

## 2023-06-12 PROCEDURE — C1781 MESH (IMPLANTABLE): HCPCS | Performed by: SURGERY

## 2023-06-12 PROCEDURE — 49651 LAP ING HERNIA REPAIR RECUR: CPT | Performed by: PHYSICIAN ASSISTANT

## 2023-06-12 PROCEDURE — C1727 CATH, BAL TIS DIS, NON-VAS: HCPCS | Performed by: SURGERY

## 2023-06-12 DEVICE — BARD 3DMAX MESH LEFT EXTRA LARGE
Type: IMPLANTABLE DEVICE | Site: INGUINAL | Status: FUNCTIONAL
Brand: BARD 3DMAX MESH

## 2023-06-12 DEVICE — BARD 3DMAX MESH RIGHT EXTRA LARGE
Type: IMPLANTABLE DEVICE | Site: INGUINAL | Status: FUNCTIONAL
Brand: BARD 3DMAX MESH

## 2023-06-12 DEVICE — ACCESS AND DISSECTOR SYSTEM
Type: IMPLANTABLE DEVICE | Status: FUNCTIONAL
Brand: SPACEMAKER

## 2023-06-12 RX ORDER — HYDROMORPHONE HCL/PF 1 MG/ML
SYRINGE (ML) INJECTION AS NEEDED
Status: DISCONTINUED | OUTPATIENT
Start: 2023-06-12 | End: 2023-06-12

## 2023-06-12 RX ORDER — SODIUM CHLORIDE, SODIUM LACTATE, POTASSIUM CHLORIDE, CALCIUM CHLORIDE 600; 310; 30; 20 MG/100ML; MG/100ML; MG/100ML; MG/100ML
125 INJECTION, SOLUTION INTRAVENOUS CONTINUOUS
Status: CANCELLED | OUTPATIENT
Start: 2023-06-12

## 2023-06-12 RX ORDER — ROCURONIUM BROMIDE 10 MG/ML
INJECTION, SOLUTION INTRAVENOUS AS NEEDED
Status: DISCONTINUED | OUTPATIENT
Start: 2023-06-12 | End: 2023-06-12

## 2023-06-12 RX ORDER — OXYCODONE HYDROCHLORIDE 5 MG/1
5 TABLET ORAL EVERY 4 HOURS PRN
Qty: 15 TABLET | Refills: 0 | Status: SHIPPED | OUTPATIENT
Start: 2023-06-12 | End: 2023-06-15

## 2023-06-12 RX ORDER — OXYCODONE HYDROCHLORIDE 5 MG/1
5 TABLET ORAL EVERY 4 HOURS PRN
Status: DISCONTINUED | OUTPATIENT
Start: 2023-06-12 | End: 2023-06-12 | Stop reason: HOSPADM

## 2023-06-12 RX ORDER — SODIUM CHLORIDE, SODIUM LACTATE, POTASSIUM CHLORIDE, CALCIUM CHLORIDE 600; 310; 30; 20 MG/100ML; MG/100ML; MG/100ML; MG/100ML
INJECTION, SOLUTION INTRAVENOUS CONTINUOUS PRN
Status: DISCONTINUED | OUTPATIENT
Start: 2023-06-12 | End: 2023-06-12

## 2023-06-12 RX ORDER — ACETAMINOPHEN 325 MG/1
650 TABLET ORAL EVERY 6 HOURS PRN
Status: DISCONTINUED | OUTPATIENT
Start: 2023-06-12 | End: 2023-06-12 | Stop reason: HOSPADM

## 2023-06-12 RX ORDER — HYDROMORPHONE HCL/PF 1 MG/ML
0.4 SYRINGE (ML) INJECTION
Status: DISCONTINUED | OUTPATIENT
Start: 2023-06-12 | End: 2023-06-12 | Stop reason: HOSPADM

## 2023-06-12 RX ORDER — MIDAZOLAM HYDROCHLORIDE 2 MG/2ML
INJECTION, SOLUTION INTRAMUSCULAR; INTRAVENOUS AS NEEDED
Status: DISCONTINUED | OUTPATIENT
Start: 2023-06-12 | End: 2023-06-12

## 2023-06-12 RX ORDER — ONDANSETRON 2 MG/ML
4 INJECTION INTRAMUSCULAR; INTRAVENOUS EVERY 6 HOURS PRN
Status: DISCONTINUED | OUTPATIENT
Start: 2023-06-12 | End: 2023-06-12 | Stop reason: HOSPADM

## 2023-06-12 RX ORDER — PROPOFOL 10 MG/ML
INJECTION, EMULSION INTRAVENOUS AS NEEDED
Status: DISCONTINUED | OUTPATIENT
Start: 2023-06-12 | End: 2023-06-12

## 2023-06-12 RX ORDER — FENTANYL CITRATE/PF 50 MCG/ML
50 SYRINGE (ML) INJECTION
Status: DISCONTINUED | OUTPATIENT
Start: 2023-06-12 | End: 2023-06-12 | Stop reason: HOSPADM

## 2023-06-12 RX ORDER — OXYCODONE HYDROCHLORIDE 5 MG/1
10 TABLET ORAL EVERY 4 HOURS PRN
Status: DISCONTINUED | OUTPATIENT
Start: 2023-06-12 | End: 2023-06-12 | Stop reason: HOSPADM

## 2023-06-12 RX ORDER — DEXAMETHASONE SODIUM PHOSPHATE 10 MG/ML
INJECTION, SOLUTION INTRAMUSCULAR; INTRAVENOUS AS NEEDED
Status: DISCONTINUED | OUTPATIENT
Start: 2023-06-12 | End: 2023-06-12

## 2023-06-12 RX ORDER — ONDANSETRON 2 MG/ML
4 INJECTION INTRAMUSCULAR; INTRAVENOUS ONCE AS NEEDED
Status: DISCONTINUED | OUTPATIENT
Start: 2023-06-12 | End: 2023-06-12 | Stop reason: HOSPADM

## 2023-06-12 RX ORDER — CEFAZOLIN SODIUM 2 G/50ML
2000 SOLUTION INTRAVENOUS ONCE
Status: COMPLETED | OUTPATIENT
Start: 2023-06-12 | End: 2023-06-12

## 2023-06-12 RX ORDER — EPHEDRINE SULFATE 50 MG/ML
INJECTION INTRAVENOUS AS NEEDED
Status: DISCONTINUED | OUTPATIENT
Start: 2023-06-12 | End: 2023-06-12

## 2023-06-12 RX ORDER — ACETAMINOPHEN 325 MG/1
650 TABLET ORAL EVERY 6 HOURS PRN
Qty: 60 TABLET | Refills: 0
Start: 2023-06-12

## 2023-06-12 RX ORDER — FENTANYL CITRATE 50 UG/ML
INJECTION, SOLUTION INTRAMUSCULAR; INTRAVENOUS AS NEEDED
Status: DISCONTINUED | OUTPATIENT
Start: 2023-06-12 | End: 2023-06-12

## 2023-06-12 RX ORDER — LIDOCAINE HYDROCHLORIDE 10 MG/ML
INJECTION, SOLUTION EPIDURAL; INFILTRATION; INTRACAUDAL; PERINEURAL AS NEEDED
Status: DISCONTINUED | OUTPATIENT
Start: 2023-06-12 | End: 2023-06-12

## 2023-06-12 RX ORDER — ONDANSETRON 2 MG/ML
INJECTION INTRAMUSCULAR; INTRAVENOUS AS NEEDED
Status: DISCONTINUED | OUTPATIENT
Start: 2023-06-12 | End: 2023-06-12

## 2023-06-12 RX ORDER — BUPIVACAINE HYDROCHLORIDE 2.5 MG/ML
INJECTION, SOLUTION EPIDURAL; INFILTRATION; INTRACAUDAL AS NEEDED
Status: DISCONTINUED | OUTPATIENT
Start: 2023-06-12 | End: 2023-06-12 | Stop reason: HOSPADM

## 2023-06-12 RX ADMIN — OXYCODONE HYDROCHLORIDE 5 MG: 5 TABLET ORAL at 12:10

## 2023-06-12 RX ADMIN — ONDANSETRON 4 MG: 2 INJECTION INTRAMUSCULAR; INTRAVENOUS at 10:59

## 2023-06-12 RX ADMIN — LIDOCAINE HYDROCHLORIDE 50 MG: 10 INJECTION, SOLUTION EPIDURAL; INFILTRATION; INTRACAUDAL; PERINEURAL at 09:48

## 2023-06-12 RX ADMIN — MIDAZOLAM 2 MG: 1 INJECTION INTRAMUSCULAR; INTRAVENOUS at 09:43

## 2023-06-12 RX ADMIN — CEFAZOLIN SODIUM 2000 MG: 2 SOLUTION INTRAVENOUS at 09:52

## 2023-06-12 RX ADMIN — HYDROMORPHONE HYDROCHLORIDE 0.5 MG: 1 INJECTION, SOLUTION INTRAMUSCULAR; INTRAVENOUS; SUBCUTANEOUS at 11:03

## 2023-06-12 RX ADMIN — SODIUM CHLORIDE, SODIUM LACTATE, POTASSIUM CHLORIDE, AND CALCIUM CHLORIDE: .6; .31; .03; .02 INJECTION, SOLUTION INTRAVENOUS at 08:28

## 2023-06-12 RX ADMIN — SUGAMMADEX 200 MG: 100 INJECTION, SOLUTION INTRAVENOUS at 11:02

## 2023-06-12 RX ADMIN — FENTANYL CITRATE 100 MCG: 50 INJECTION, SOLUTION INTRAMUSCULAR; INTRAVENOUS at 09:48

## 2023-06-12 RX ADMIN — DEXAMETHASONE SODIUM PHOSPHATE 10 MG: 10 INJECTION INTRAMUSCULAR; INTRAVENOUS at 10:11

## 2023-06-12 RX ADMIN — PROPOFOL 200 MG: 10 INJECTION, EMULSION INTRAVENOUS at 09:48

## 2023-06-12 RX ADMIN — ROCURONIUM BROMIDE 50 MG: 10 INJECTION INTRAVENOUS at 09:49

## 2023-06-12 RX ADMIN — EPHEDRINE SULFATE 10 MG: 50 INJECTION INTRAVENOUS at 10:20

## 2023-06-12 RX ADMIN — SODIUM CHLORIDE, SODIUM LACTATE, POTASSIUM CHLORIDE, AND CALCIUM CHLORIDE: .6; .31; .03; .02 INJECTION, SOLUTION INTRAVENOUS at 10:46

## 2023-06-12 NOTE — DISCHARGE INSTR - AVS FIRST PAGE
Post-Operative Care Instructions             Dr John Paul PRAJAPATI     1  General: Bonnie Farmer will feel pulling sensations around the wound and/or aches and pains around the incisions  This is normal  Even minor surgery is a change in your body and this is your body’s way of reaction to it  If you have had abdominal surgery, it may help to support the incision with a small pillow or blanket for comfort when moving or coughing  2  Wound care:    Bandage/Dressing - Make sure to remove the bandage in about 48 hours, unless instructed otherwise  You usually don't have to redress the wound after 24-48 hours, unless for comfort  Keep the incision clean and dry  Let air get to it  If the Steri-Strips fall off, just keep the wound clean  Glue - Leave glue alone, it will fall off on its own, no need for an additional dressings    3  Water: You may shower over the wound, unless there are drain tubes left in place  Do not bathe or use a pool or hot tub until cleared by the physician  You may shower right over the staples, glue or Steri-Strips and rinse wound with soapy water but do not scrub incision pat dry when you are done  4  Activity: You may go up and down stairs, walk as much as you are comfortable, but walk at least 3 times each day  If you have had abdominal or hernia surgery, do not lift anything heavier than 15 pounds for at least 4 weeks  5  Diet: You may resume a regular diet  If you had a same-day surgery or overnight stay surgery, you may wish to eat lightly for a few days: soups, crackers, and sandwiches  You may resume a regular diet when ready  6  Medications: Resume all of your previous medications, unless told otherwise by the doctor  Tylenol and ibuoprofen is always fine, unless you are taking any narcotic pain medication containing Tylenol (such as Percocet, Darvocet, Vicodin, or anything containing acetaminophen)  Do not take Tylenol if you're taking these medications   You do not need to take the narcotic pain medications unless you are having significant pain and discomfort  7  Driving: He will need someone to drive you home on the day of surgery  Do not drive or make any important decisions while on narcotic pain medication or 24 hours and after anesthesia or sedation for surgery  Generally, you may drive when your off all narcotic pain medications, and you can turn in your seat comfortably to check your blind spot  8  Upset Stomach: You may take Maalox, Tums, or similar items for an upset stomach  If your narcotic pain medication causes an upset stomach, do not take it on an empty stomach  Try taking it with at least some crackers or toast      9  Constipation: Patients often experienced constipation after surgery  You may take over-the-counter medication for this, such as Metamucil, Senokot, Dulcolax, milk of magnesia, etc  You may take a suppository unless you have had anorectal surgery such as a procedure on your hemorrhoids  If you experience significant nausea or vomiting after abdominal surgery, call the office before trying any of these medications  10  Call the office: If you are experiencing any of the following, fevers above 101 5°, significant nausea or vomiting, if the wound develops drainage and/or is excessive redness around the wound, or if you have significant diarrhea or other worsening symptoms  11  Pain: You may be given a prescription for pain  This will be given to the hospital, the day of surgery  12  Sexual Activity: You may resume sexual activity when you feel ready and comfortable and your incision is sealed and healed without apparent infection risk      [facility]  Phone: [phone]

## 2023-06-12 NOTE — ANESTHESIA POSTPROCEDURE EVALUATION
Post-Op Assessment Note    CV Status:  Stable  Pain Score: 0    Pain management: adequate     Mental Status:  Alert and awake   Hydration Status:  Euvolemic   PONV Controlled:  Controlled   Airway Patency:  Patent      Post Op Vitals Reviewed: Yes      Staff: CRNA         No notable events documented      BP   120/70   Temp   97 7   Pulse  98   Resp   15   SpO2   100%

## 2023-06-12 NOTE — INTERIM OP NOTE
REPAIR HERNIA INGUINAL, LAPAROSCOPIC WITH MESH; POSSIBLE OPEN  Postoperative Note  PATIENT NAME: Reyes Gardner  : 1985  MRN: [id number]  [facility] OR ROOM 01    Surgery Date: 2023    Preop Diagnosis:  Bilateral recurrent inguinal hernia without obstruction or gangrene [K40 21]    Post-Op Diagnosis Codes:     * Bilateral recurrent inguinal hernia without obstruction or gangrene [K40 21]    Procedure(s) (LRB):  REPAIR HERNIA INGUINAL, LAPAROSCOPIC WITH MESH; POSSIBLE OPEN (Bilateral)    Surgeon(s) and Role:     * Gely Biswas MD - Primary     Sterling Thompson PA-C - Assisting    Specimens:  * No specimens in log *    Estimated Blood Loss:   Minimal    Anesthesia Type:   General ETA    Findings:    as above  Complications:   None    Hernia Surgery Operative Note    Name: Reyes Gardner    Gender: male    Age: 45 y o  Race:     BMI: Body mass index is 31 38 kg/m²  DIAGNOSIS: No diagnosis found      Diabetes Mellitis: No    Coronary Heart Disease: No    Cancer: No    Steroid Use: No    Tobacco use: Yes   Last used: today   Type: cigarettes   Frequency (per day): 1/2 ppd   Duration (years):     Alcohol use: Yes Minimal     Location of Hernia: right indirect inguinal hernia  Length:1 cm  Width:1 cm  Primary: Yes  Recurrent: No   Number of recurrences:    Access: Laparoscope    Component Separation: No    Mesh:   Yes -  Type: Synthetic   XL right 3D MAX    Location of Hernia: left direct inguinal hernia  Length:1 5 cm  Width:1 5 cm  Primary: Yes  Recurrent: No   Number of recurrences:    Access: Laparoscope    Component Separation: No    Mesh:   Yes -  Type: Synthetic   XL left 3D MAX    Operative Time: 63 min               SIGNATURE: William Thompson PA-C   DATE: 2023   TIME: 11:08 AM

## 2023-06-12 NOTE — ANESTHESIA PREPROCEDURE EVALUATION
Procedure:  REPAIR HERNIA INGUINAL, LAPAROSCOPIC WITH MESH; POSSIBLE OPEN (Bilateral: Groin)    Relevant Problems   ANESTHESIA (within normal limits)      PULMONARY   (+) Sleep apnea      Other   (+) Bilateral inguinal hernia without obstruction or gangrene       Physical Exam    Airway    Mallampati score: I  TM Distance: >3 FB  Neck ROM: full     Dental   No notable dental hx     Cardiovascular  Cardiovascular exam normal    Pulmonary  Pulmonary exam normal     Other Findings        Anesthesia Plan  ASA Score- 2     Anesthesia Type- general with ASA Monitors  Additional Monitors:   Airway Plan: ETT  Comment: I discussed risks (reviewed with patient on the anesthesia consent form), benefits and alternatives for General Anesthesia  These risks did include breathing tube remaining in place if not strong enough, PONV, damage to lips and teeth, sore throat, eye injury or blindness, risk of heart attack or stroke that may lead to death          Plan Factors-    Chart reviewed  Patient summary reviewed  Induction- intravenous  Postoperative Plan- Plan for postoperative opioid use  Informed Consent- Anesthetic plan and risks discussed with patient  I personally reviewed this patient with the CRNA  Discussed and agreed on the Anesthesia Plan with the CRNA  Brenton Mary

## 2023-06-12 NOTE — OP NOTE
OPERATIVE REPORT  PATIENT NAME: Adele Dennis    :  1985  MRN: [id number]  Pt Location: [facility] OR ROOM 01    SURGERY DATE: 2023    Surgeon(s) and Role:     * Catrachita Dietrich MD - Primary     Mami Thompson PA-C - Assisting    Preop Diagnosis: 26-year-old male with  Bilateral recurrent inguinal hernia without obstruction or gangrene [K40 21]    Post-Op Diagnosis Codes:     * Bilateral recurrent inguinal hernia without obstruction or gangrene [K40 21]    Procedure(s):  Bilateral - REPAIR HERNIA INGUINAL  LAPAROSCOPIC WITH MESH; POSSIBLE OPEN    Specimen(s):  * No specimens in log *    Estimated Blood Loss:   Minimal    Drains:  * No LDAs found *    Anesthesia Type:   General    Operative Indications:  Bilateral recurrent inguinal hernia without obstruction or gangrene []  26-year-old male with bilateral groin bulges, with some moderate left groin pain  Work-up revealed bilateral inguinal hernias  After discussion of risk and benefits, he opted to proceed back to the operating room for planned laparoscopic bilateral inguinal hernia repair with mesh  Operative Findings: Moderate sized left-sided direct space hernia defect  Small right-sided indirect defect  Both repaired with Bard 3D max extra-large meshes  Complications:   None    Procedure and Technique:  The patient was seen again in the Holding Room  The risks, benefits, complications, treatment options, and expected outcomes were discussed with the patient  The patient and/or family concurred with the proposed plan, giving informed consent  The site of surgery properly noted/marked  The patient was taken to Operating Room, identified by verbal and visual wrist identification and the procedure verified as Laparoscopic bilateral  inguinal hernia repair with mesh  The above information was confirmed  Prior to the induction of general anesthesia, antibiotic prophylaxis was administered   General endotracheal anesthesia was then administered and tolerated well  After the induction, the abdomen was prepped in the usual sterile fashion  The patient was positioned in the supine position with both arms tucked  A Time Out was held after prepping and draping in sterile fashion  An incision was made in the periumbilical fold after infusion of 0 25% marcaine with epinephrine with an #11 blade scalpel  Subcutaneous tissues were dissected with bovie cautery down to the anterior fascia  The anterior fascia was opened revealing the rectus muscle  This was retracted laterally and a ballon tipped dissector was passed in the preperitoneal space down to the pubic bone  The balloon was inflated under direct visualization  The a balloon tipped trocar was placed in the space and the gas was turned on  Two 5mm trocars were placed in the midline below the umbilical port  Attention was turned to the midline  The avascular plane was dissected down to the midline until the pubic bone was visualized  We began on the left side, and there was evidence of a direct hernia that was dissected out of the hernia defect until reduction was complete  Attention was turned out laterally creating the pocket and avascular plane between the peritoneum and the sidewall  Attention was turned to the indirect space and the peritoneum was dissected off the cord structures carefully peeling this layers apart  Eventually the peritoneum was dissected down well below where the vas deferens turned medially  Now the dissection was complete  The patient had evidence of a direct inguinal hernia  A 3D Max mesh was rolled up and passed through the trocar and deployed in appropriate position  This was tacked with an absorbable tacker at luann's ligament and then in the midline  The peritoneal edge was well below the bottom portion of the mesh  We then turned our attention to the right side  A similar dissection was completed with evidence of a small indirect inguinal hernia    There is no evidence of a direct inguinal hernia  There is excellent hemostasis  The gas was then turned off and the air was evacuated watching to ensure that the peritoneum held the mesh in appropriate position  The trochars were removed  The umbilical fascia was closed with figure-of-eight 0 Vicryl sutures  Skin was closed with interrupted 4-0 Monocryl sutures  Skin glue was applied  I was present for the entire procedure , A qualified resident physician was not available  and A physician assistant was required during the procedure for retraction, tissue handling, dissection and suturing      Patient Disposition:  PACU  and hemodynamically stable        SIGNATURE: Slick Cowan MD  DATE: June 12, 2023  TIME: 11:10 AM

## 2023-06-12 NOTE — INTERVAL H&P NOTE
H&P reviewed  After examining the patient I find no changes in the patients condition since the H&P had been written  We will plan for laparoscopic bilateral inguinal hernia repair with mesh  Patient is amenable to risk benefits, we will proceed back to the operating room expeditiously      Vitals:    06/12/23 0818   BP: 132/81   Pulse: 87   Resp: 16   Temp: 98 1 °F (36 7 °C)   SpO2: 99%

## 2023-06-19 ENCOUNTER — DOCUMENTATION (OUTPATIENT)
Dept: OTHER | Facility: HOSPITAL | Age: 38
End: 2023-06-19

## 2023-06-19 DIAGNOSIS — Z98.890 S/P HERNIA REPAIR: Primary | ICD-10-CM

## 2023-06-19 DIAGNOSIS — Z87.19 S/P HERNIA REPAIR: Primary | ICD-10-CM

## 2023-06-19 RX ORDER — TAMSULOSIN HYDROCHLORIDE 0.4 MG/1
0.4 CAPSULE ORAL
Qty: 8 CAPSULE | Refills: 0 | Status: SHIPPED | OUTPATIENT
Start: 2023-06-19 | End: 2023-06-28 | Stop reason: SDUPTHER

## 2023-06-28 ENCOUNTER — OFFICE VISIT (OUTPATIENT)
Dept: SURGERY | Facility: CLINIC | Age: 38
End: 2023-06-28

## 2023-06-28 VITALS
WEIGHT: 222.5 LBS | SYSTOLIC BLOOD PRESSURE: 134 MMHG | HEART RATE: 100 BPM | HEIGHT: 71 IN | TEMPERATURE: 97.9 F | BODY MASS INDEX: 31.15 KG/M2 | DIASTOLIC BLOOD PRESSURE: 87 MMHG

## 2023-06-28 DIAGNOSIS — Z87.19 S/P HERNIA REPAIR: ICD-10-CM

## 2023-06-28 DIAGNOSIS — Z98.890 S/P HERNIA REPAIR: ICD-10-CM

## 2023-06-28 DIAGNOSIS — K40.20 BILATERAL INGUINAL HERNIA WITHOUT OBSTRUCTION OR GANGRENE, RECURRENCE NOT SPECIFIED: Primary | ICD-10-CM

## 2023-06-28 PROCEDURE — 99024 POSTOP FOLLOW-UP VISIT: CPT | Performed by: SURGERY

## 2023-06-28 RX ORDER — TAMSULOSIN HYDROCHLORIDE 0.4 MG/1
0.4 CAPSULE ORAL
Qty: 8 CAPSULE | Refills: 0 | Status: SHIPPED | OUTPATIENT
Start: 2023-06-28 | End: 2023-07-05

## 2023-06-30 ENCOUNTER — APPOINTMENT (RX ONLY)
Dept: URBAN - METROPOLITAN AREA CLINIC 374 | Facility: CLINIC | Age: 38
Setting detail: DERMATOLOGY
End: 2023-06-30

## 2023-06-30 DIAGNOSIS — L81.4 OTHER MELANIN HYPERPIGMENTATION: ICD-10-CM

## 2023-06-30 DIAGNOSIS — D18.0 HEMANGIOMA: ICD-10-CM

## 2023-06-30 DIAGNOSIS — D22 MELANOCYTIC NEVI: ICD-10-CM

## 2023-06-30 DIAGNOSIS — D17 BENIGN LIPOMATOUS NEOPLASM: ICD-10-CM

## 2023-06-30 DIAGNOSIS — L82.1 OTHER SEBORRHEIC KERATOSIS: ICD-10-CM

## 2023-06-30 PROBLEM — D17.21 BENIGN LIPOMATOUS NEOPLASM OF SKIN AND SUBCUTANEOUS TISSUE OF RIGHT ARM: Status: ACTIVE | Noted: 2023-06-30

## 2023-06-30 PROBLEM — D18.01 HEMANGIOMA OF SKIN AND SUBCUTANEOUS TISSUE: Status: ACTIVE | Noted: 2023-06-30

## 2023-06-30 PROBLEM — D22.5 MELANOCYTIC NEVI OF TRUNK: Status: ACTIVE | Noted: 2023-06-30

## 2023-06-30 PROCEDURE — ? SHAVE REMOVAL

## 2023-06-30 PROCEDURE — ? PHOTO-DOCUMENTATION

## 2023-06-30 PROCEDURE — 99203 OFFICE O/P NEW LOW 30 MIN: CPT | Mod: 25

## 2023-06-30 PROCEDURE — ? SUNSCREEN RECOMMENDATIONS

## 2023-06-30 PROCEDURE — ? COUNSELING

## 2023-06-30 PROCEDURE — ? FULL BODY SKIN EXAM

## 2023-06-30 PROCEDURE — 11300 SHAVE SKIN LESION 0.5 CM/<: CPT

## 2023-06-30 ASSESSMENT — LOCATION SIMPLE DESCRIPTION DERM
LOCATION SIMPLE: LEFT CHEEK
LOCATION SIMPLE: LEFT HAND
LOCATION SIMPLE: RIGHT HAND
LOCATION SIMPLE: RIGHT UPPER BACK
LOCATION SIMPLE: UPPER BACK
LOCATION SIMPLE: RIGHT UPPER ARM

## 2023-06-30 ASSESSMENT — LOCATION ZONE DERM
LOCATION ZONE: ARM
LOCATION ZONE: FACE
LOCATION ZONE: HAND
LOCATION ZONE: TRUNK

## 2023-06-30 ASSESSMENT — LOCATION DETAILED DESCRIPTION DERM
LOCATION DETAILED: RIGHT RADIAL DORSAL HAND
LOCATION DETAILED: SUPERIOR THORACIC SPINE
LOCATION DETAILED: RIGHT MEDIAL UPPER BACK
LOCATION DETAILED: RIGHT ANTERIOR PROXIMAL UPPER ARM
LOCATION DETAILED: LEFT INFERIOR MEDIAL MALAR CHEEK
LOCATION DETAILED: RIGHT SUPERIOR UPPER BACK
LOCATION DETAILED: LEFT RADIAL DORSAL HAND
LOCATION DETAILED: INFERIOR THORACIC SPINE

## 2023-06-30 NOTE — ASSESSMENT & PLAN NOTE
22-year-old male status post laparoscopic bilateral inguinal hernia repair with mesh on 6/12/2023, here for follow-up  Plan:  Overall he is improved with still some mild urinary urgency  Additionally he has a small left-sided seroma  I did another week of Flomax, and would like to see him back in 4 weeks for a follow-up visit for his seroma

## 2023-06-30 NOTE — PROCEDURE: SHAVE REMOVAL
Hemostasis: Drysol
Bill For Surgical Tray: no
Notification Instructions: Patient will be notified of pathology results. However, patient instructed to call the office if not contacted within 2 weeks.
Lab Facility: 3
Anesthesia Type: 1% lidocaine with epinephrine
Medical Necessity Information: It is in your best interest to select a reason for this procedure from the list below. All of these items fulfill various CMS LCD requirements except the new and changing color options.
X Size Of Lesion In Cm (Optional): 0
Detail Level: Detailed
Biopsy Method: Personna blade
Post-Care Instructions: I reviewed with the patient in detail post-care instructions. Patient is to keep the biopsy site dry overnight, and then apply aquaphor twice daily until healed.
Wound Care: Petrolatum
Billing Type: Third-Party Bill
Consent was obtained from the patient. The risks and benefits to therapy were discussed in detail. Specifically, the risks of infection, scarring, bleeding, prolonged wound healing, incomplete removal, allergy to anesthesia, nerve injury and recurrence were addressed. Prior to the procedure, the treatment site was clearly identified and confirmed by the patient.
Medical Necessity Clause: This procedure was medically necessary because the lesion that was treated was:
Lab: 6
Size Of Lesion In Cm (Required): 0.5
Was A Bandage Applied: Yes

## 2023-06-30 NOTE — PROGRESS NOTES
Assessment/Plan:    Bilateral inguinal hernia without obstruction or gangrene  41-year-old male status post laparoscopic bilateral inguinal hernia repair with mesh on 6/12/2023, here for follow-up  Plan:  Overall he is improved with still some mild urinary urgency  Additionally he has a small left-sided seroma  I did another week of Flomax, and would like to see him back in 4 weeks for a follow-up visit for his seroma  Diagnoses and all orders for this visit:    Bilateral inguinal hernia without obstruction or gangrene, recurrence not specified    S/P hernia repair  -     tamsulosin (FLOMAX) 0 4 mg; Take 1 capsule (0 4 mg total) by mouth daily with dinner for 7 days Take first dose this afternoon  Then starting the night of 6/19, take one capsule nightly  Subjective:      Patient ID: Yuridia Perales is a 45 y o  male  41-year-old male well-known to me status post laparoscopic bilateral inguinal hernia repair with mesh on 6/12/2023, here for follow-up  Initially he had some urinary frequency, worse at night, and some bilateral groin pain  This is steadily improved, although he is still having some issues with starting his stream of urine  He denies any nausea, vomiting, fevers, chills, chest pain, shortness of breath  He feels a small lump in his left groin but otherwise is doing well  The following portions of the patient's history were reviewed and updated as appropriate:   He  has a past medical history of COVID (2021), GERD (gastroesophageal reflux disease), Inguinal hernia, bilateral, MRSA (methicillin resistant staph aureus) culture positive, and Sleep apnea  He   Patient Active Problem List    Diagnosis Date Noted   • Sleep apnea    • Bilateral inguinal hernia without obstruction or gangrene 05/24/2023     He  has a past surgical history that includes Hernia repair (2018) and pr laparoscopy surg rpr initial inguinal hernia (Bilateral, 6/12/2023)    His family history includes Cancer in his father; No Known Problems in his mother  He  reports that he has been smoking cigarettes  He has been smoking an average of  5 packs per day  He has never used smokeless tobacco  He reports current alcohol use  He reports that he does not use drugs  Current Outpatient Medications   Medication Sig Dispense Refill   • tamsulosin (FLOMAX) 0 4 mg Take 1 capsule (0 4 mg total) by mouth daily with dinner for 7 days Take first dose this afternoon  Then starting the night of 6/19, take one capsule nightly  8 capsule 0   • acetaminophen (TYLENOL) 325 mg tablet Take 2 tablets (650 mg total) by mouth every 6 (six) hours as needed for mild pain or moderate pain 60 tablet 0     No current facility-administered medications for this visit  Current Outpatient Medications on File Prior to Visit   Medication Sig   • acetaminophen (TYLENOL) 325 mg tablet Take 2 tablets (650 mg total) by mouth every 6 (six) hours as needed for mild pain or moderate pain     No current facility-administered medications on file prior to visit  He is allergic to bactrim [sulfamethoxazole-trimethoprim]       Review of Systems   Constitutional: Negative for appetite change, chills, diaphoresis and fever  HENT: Negative for nosebleeds and trouble swallowing  Eyes: Negative  Respiratory: Negative for cough, shortness of breath and wheezing  Cardiovascular: Negative for chest pain, palpitations and leg swelling  Gastrointestinal: Negative for abdominal distention, abdominal pain, nausea and vomiting  Genitourinary: Negative for difficulty urinating, flank pain and frequency  Musculoskeletal: Negative for arthralgias, joint swelling and myalgias  Skin: Negative for pallor and rash  Neurological: Negative for dizziness, facial asymmetry and speech difficulty  Hematological: Does not bruise/bleed easily  Psychiatric/Behavioral: Negative for agitation and confusion  All other systems reviewed and are negative  "    Objective:      /87 (BP Location: Left arm, Patient Position: Sitting, Cuff Size: Large)   Pulse 100   Temp 97 9 °F (36 6 °C) (Skin)   Ht 5' 11\" (1 803 m)   Wt 101 kg (222 lb 8 oz)   BMI 31 03 kg/m²          Physical Exam  Vitals and nursing note reviewed  Constitutional:       General: He is not in acute distress  Appearance: Normal appearance  He is not toxic-appearing  HENT:      Head: Normocephalic and atraumatic  Mouth/Throat:      Mouth: Mucous membranes are moist    Eyes:      Extraocular Movements: Extraocular movements intact  Pupils: Pupils are equal, round, and reactive to light  Cardiovascular:      Rate and Rhythm: Normal rate and regular rhythm  Pulses: Normal pulses  Pulmonary:      Effort: Pulmonary effort is normal  No respiratory distress  Breath sounds: Normal breath sounds  No wheezing  Abdominal:      General: There is no distension  Palpations: Abdomen is soft  There is no mass  Tenderness: There is no abdominal tenderness  There is no guarding or rebound  Hernia: No hernia is present  Comments: Well-healed laparoscopic port sites  Small left sided seroma, 2 x 2 cm  Musculoskeletal:         General: No swelling or deformity  Normal range of motion  Cervical back: Normal range of motion and neck supple  Right lower leg: No edema  Left lower leg: No edema  Skin:     General: Skin is warm and dry  Coloration: Skin is not jaundiced  Neurological:      General: No focal deficit present  Mental Status: He is alert and oriented to person, place, and time     Psychiatric:         Mood and Affect: Mood normal          Behavior: Behavior normal          "

## 2025-01-28 ENCOUNTER — APPOINTMENT (OUTPATIENT)
Dept: URBAN - METROPOLITAN AREA CLINIC 374 | Facility: CLINIC | Age: 40
Setting detail: DERMATOLOGY
End: 2025-01-28

## 2025-01-28 DIAGNOSIS — L82.1 OTHER SEBORRHEIC KERATOSIS: ICD-10-CM | Status: UNCHANGED

## 2025-01-28 DIAGNOSIS — L91.8 OTHER HYPERTROPHIC DISORDERS OF THE SKIN: ICD-10-CM | Status: UNCHANGED

## 2025-01-28 DIAGNOSIS — L81.4 OTHER MELANIN HYPERPIGMENTATION: ICD-10-CM | Status: UNCHANGED

## 2025-01-28 DIAGNOSIS — D18.0 HEMANGIOMA: ICD-10-CM | Status: UNCHANGED

## 2025-01-28 DIAGNOSIS — Z80.8 FAMILY HISTORY OF MALIGNANT NEOPLASM OF OTHER ORGANS OR SYSTEMS: ICD-10-CM

## 2025-01-28 DIAGNOSIS — Z71.89 OTHER SPECIFIED COUNSELING: ICD-10-CM

## 2025-01-28 DIAGNOSIS — D22 MELANOCYTIC NEVI: ICD-10-CM | Status: UNCHANGED

## 2025-01-28 PROBLEM — D18.01 HEMANGIOMA OF SKIN AND SUBCUTANEOUS TISSUE: Status: ACTIVE | Noted: 2025-01-28

## 2025-01-28 PROBLEM — D23.62 OTHER BENIGN NEOPLASM OF SKIN OF LEFT UPPER LIMB, INCLUDING SHOULDER: Status: ACTIVE | Noted: 2025-01-28

## 2025-01-28 PROBLEM — D23.71 OTHER BENIGN NEOPLASM OF SKIN OF RIGHT LOWER LIMB, INCLUDING HIP: Status: ACTIVE | Noted: 2025-01-28

## 2025-01-28 PROBLEM — D22.5 MELANOCYTIC NEVI OF TRUNK: Status: ACTIVE | Noted: 2025-01-28

## 2025-01-28 PROCEDURE — ? FULL BODY SKIN EXAM

## 2025-01-28 PROCEDURE — 99213 OFFICE O/P EST LOW 20 MIN: CPT

## 2025-01-28 PROCEDURE — ? COUNSELING

## 2025-01-28 PROCEDURE — ? TREATMENT REGIMEN

## 2025-01-28 ASSESSMENT — LOCATION DETAILED DESCRIPTION DERM
LOCATION DETAILED: LEFT RADIAL DORSAL HAND
LOCATION DETAILED: LEFT AXILLARY VAULT
LOCATION DETAILED: INFERIOR THORACIC SPINE
LOCATION DETAILED: RIGHT INFERIOR LATERAL NECK
LOCATION DETAILED: LEFT INFERIOR MEDIAL MALAR CHEEK
LOCATION DETAILED: RIGHT MEDIAL UPPER BACK
LOCATION DETAILED: RIGHT AXILLARY VAULT
LOCATION DETAILED: RIGHT RADIAL DORSAL HAND
LOCATION DETAILED: SUPERIOR THORACIC SPINE
LOCATION DETAILED: LEFT INFERIOR LATERAL NECK
LOCATION DETAILED: EPIGASTRIC SKIN

## 2025-01-28 ASSESSMENT — LOCATION SIMPLE DESCRIPTION DERM
LOCATION SIMPLE: RIGHT HAND
LOCATION SIMPLE: LEFT ANTERIOR NECK
LOCATION SIMPLE: UPPER BACK
LOCATION SIMPLE: LEFT AXILLARY VAULT
LOCATION SIMPLE: LEFT HAND
LOCATION SIMPLE: RIGHT AXILLARY VAULT
LOCATION SIMPLE: RIGHT ANTERIOR NECK
LOCATION SIMPLE: RIGHT UPPER BACK
LOCATION SIMPLE: LEFT CHEEK
LOCATION SIMPLE: ABDOMEN

## 2025-01-28 ASSESSMENT — LOCATION ZONE DERM
LOCATION ZONE: TRUNK
LOCATION ZONE: FACE
LOCATION ZONE: HAND
LOCATION ZONE: NECK
LOCATION ZONE: AXILLAE

## 2025-01-28 NOTE — PROCEDURE: FULL BODY SKIN EXAM
Follow Instructions Provided by your Surgical Team
Instructions: This plan will send the code FBSE to the PM system.  DO NOT or CHANGE the price.
Detail Level: Simple
Price (Do Not Change): 0.00

## 2025-03-05 ENCOUNTER — TELEPHONE (OUTPATIENT)
Age: 40
End: 2025-03-05

## 2025-03-05 NOTE — TELEPHONE ENCOUNTER
Called & scheduled patient.   No records are needed for prior arthroscopies for 2nd opinion considerations. Only for joint replacements.

## (undated) DEVICE — STIRRUP STRAP DISPOSABLE

## (undated) DEVICE — PAD POSITIONING XL W/ARM PROTECTORS

## (undated) DEVICE — SOLN IRRIG .9%SOD 3L

## (undated) DEVICE — MANIFOLD SINGLE PORT NEPTUNE

## (undated) DEVICE — DRAPE EQUIPMENT RF WAND

## (undated) DEVICE — ***USE 119929 ***DRIVER NEEDLE LRG XI REPOSABLE

## (undated) DEVICE — SUTURE STRATAFIX 2-0 30CM SPIRAL MONOCRYL PLUS UNDYED SH

## (undated) DEVICE — SUTURE MONOCRYL 4-0 Y426H PS-2 27IN

## (undated) DEVICE — SOLN IRRIG .9%SOD 1000ML

## (undated) DEVICE — GLOVE SURG PROTEXIS PF 7.5

## (undated) DEVICE — PAD GROUND ELECTROSURGICAL W/CORD

## (undated) DEVICE — PACK PBDS LAP CHOLE RF

## (undated) DEVICE — GLOVE SZ 8.5 LINER PROTEXIS PI BL

## (undated) DEVICE — ***USE 119937 ***FORCEPS PROGRASP XI REPOSABLE

## (undated) DEVICE — APPLICATOR CHLORAPREP 26ML ORANGE TINT

## (undated) DEVICE — Device

## (undated) DEVICE — DISPOSABLE OR TOWEL: Brand: CARDINAL HEALTH

## (undated) DEVICE — XI OPTICAL BLADLESS OBTURATOR 8MM

## (undated) DEVICE — TROCAR 1ST ENTRY 12 X 100MM ADV FIX

## (undated) DEVICE — STERISTRIP 1/2INX4IN

## (undated) DEVICE — DRAPE-U-1015

## (undated) DEVICE — VISUALIZATION SYSTEM: Brand: CLEARIFY

## (undated) DEVICE — SEAL UNIVERSAL 5MM-8MM XI

## (undated) DEVICE — SYSTEM VISUALIZATION CLEARIFY

## (undated) DEVICE — GLOVE SRG BIOGEL ECLIPSE 7.5

## (undated) DEVICE — GLOVE SURG PROTEXIS PF 8

## (undated) DEVICE — SYSTEM LABELING CORRECT MEDICATION

## (undated) DEVICE — INTENDED FOR TISSUE SEPARATION, AND OTHER PROCEDURES THAT REQUIRE A SHARP SURGICAL BLADE TO PUNCTURE OR CUT.: Brand: BARD-PARKER SAFETY BLADES SIZE 11, STERILE

## (undated) DEVICE — OPTIFIX AT ABSORBABLE FIXATION SYSTEM WITH ARTICULATING TECHNOLOGY - 30 ABSORBABLE FASTENERS: Brand: OPTIFIX AT ABSORBABLE FIXATION SYSTEM WITH ARTICULATING TECHNOLOGY

## (undated) DEVICE — GLOVE SZ 7.5 LINER PROTEXIS PI BL

## (undated) DEVICE — DRAPE COLUMN DAVINCI XI

## (undated) DEVICE — SHEARS TIP COVER DAVINCI ONE USE

## (undated) DEVICE — ***USE 57698*** SLEEVE FLOWTRON DVT CALF SINGLE USE

## (undated) DEVICE — ***USE 56941*** SUTURE VICRYL 0 J603H UR-6

## (undated) DEVICE — TUBING INSUFFLATION PNEUMOSURE HEATED HIGH FLOW

## (undated) DEVICE — TUBING PATIENT ARTHROSCOPY REDEUCE

## (undated) DEVICE — NEEDLE 25G X 1 1/2

## (undated) DEVICE — SUT VICRYL 0 UR-6 27 IN J603H

## (undated) DEVICE — NEPTUNE E-SEP SMOKE EVACUATION PENCIL, COATED, 70MM BLADE, PUSH BUTTON SWITCH: Brand: NEPTUNE E-SEP

## (undated) DEVICE — TIP BOVIE BLADE COATED INSULATED 2.75IN

## (undated) DEVICE — ANTI-FOG SOLUTION WITH FOAM PAD: Brand: DEVON

## (undated) DEVICE — CUFF TOURNIQUET DISP 34 X 4

## (undated) DEVICE — DRAPE ARM DAVINCI XI

## (undated) DEVICE — MARKER SURGICAL SKIN

## (undated) DEVICE — ADHESIVE SKIN HIGH VISCOSITY EXOFIN 1ML

## (undated) DEVICE — GOWN SURGICAL REINFORCED X-LAR

## (undated) DEVICE — TUBING SMOKE EVAC W/FILTRATION DEVICE PLUMEPORT ACTIV

## (undated) DEVICE — CHLORAPREP HI-LITE 26ML ORANGE

## (undated) DEVICE — INSUFFLATION TUBING PRIMFLO

## (undated) DEVICE — SANI-SERVE SLUSH DRAPE SUBSTIT

## (undated) DEVICE — SOLUTION ELECTROLUBE ANTI-STICK

## (undated) DEVICE — SUTURE ETHILON  3-0   663H

## (undated) DEVICE — BLADE SHAVER TOMCAT 4.0MM

## (undated) DEVICE — DRAPE LAPAROTOMY

## (undated) DEVICE — SUTURE STRATAFIX 2-0 30 CM